# Patient Record
Sex: FEMALE | Race: WHITE
[De-identification: names, ages, dates, MRNs, and addresses within clinical notes are randomized per-mention and may not be internally consistent; named-entity substitution may affect disease eponyms.]

---

## 2017-01-15 NOTE — ER DOCUMENT REPORT
HPI





- HPI


Patient complains to provider of: hip pain


Pain Level: 4


Context: 


Patient is a 36 old female presents emergency Department complaining of right 

hip pain.  Patient states that she works as a  and that over the past 2 

weeks she's had this right hip pain that radiates down the side of her leg to 

her knee.  She states it is worse with standing and while she is at work.  

States it is a constant ache and at its worse with a burning quality.  Improves 

with rest, heat, ice, Motrin.  Denies any injury or accident





Past medical history significant for hypothyroidism and renal stones


Past surgical history significant for breast augmentation, , tubal 

ligation, T&A





- REPRODUCTIVE


Reproductive: DENIES: Pregnant:





- DERM


Skin Color: Normal, Pink





Past Medical History





- General


Information source: Patient





- Social History


Smoking Status: Unknown if Ever Smoked


Family History: None


Patient has suicidal ideation: No


Patient has homicidal ideation: No


Endocrine Medical History: Reports: Hx Hypothyroidism


Renal/ Medical History: Denies: Hx Peritoneal Dialysis


Past Surgical History: Reports: Hx Breast Surgery, Hx  Section, Hx 

Tonsillectomy





Vertical Provider Document





- CONSTITUTIONAL


Agree With Documented VS: No - heartbeat 98/m


Exam Limitations: No Limitations


General Appearance: WD/WN, Mild Distress





- INFECTION CONTROL


TRAVEL OUTSIDE OF THE [place] IN LAST 30 DAYS: No





- RESPIRATORY


O2 Sat by Pulse Oximetry: 98





- CARDIOVASCULAR


Cardiovascular: Regular Rate, Regular Rhythm, No Murmur


Pulses: Normal: Radial, Dorsalis pedis


Notes: 


Capillary refill in all lower extremity digits less than 2 seconds





- BACK


Back: Normal Inspection.  negative: CVA Tenderness-Right, CVA Tenderness-Left


Notes: 


No tenderness to palpation, able to ambulate without any difficulty.





- MUSCULOSKELETAL/EXTREMETIES


Musculoskeletal/Extremeties: FROM, Tender - Tenderness to palpation along the 

iliotibial band distribution, No Edema.  negative: Eccymosis





- NEURO


Level of Consciousness: Awake, Alert, Appropriate


Motor/Sensory: No Motor Deficit, No Sensory Deficit





- DERM


Integumentary: Warm, Dry, No Rash





Course





- Re-evaluation


Re-evalutation: 





01/15/17 17:59


Patient is a 36 old female who is visibly uncomfortable but otherwise 

hemodynamically stable in no acute distress.  Stream physical exam along with 

benign x-ray lead to suspicion of IT band syndrome.  Patient was educated on 

cause and proper stretching and appropriate management for her condition and 

she can follow-up with her primary care provider as needed





- Vital Signs


Vital signs: 


 











Temp Pulse Resp BP Pulse Ox


 


 98.6 F   108 H  16   129/79 H  98 


 


 01/15/17 16:11  01/15/17 16:54  01/15/17 16:54  01/15/17 16:11  01/15/17 16:11














- Laboratory


Result Diagrams: 


 01/15/17 16:15





 01/15/17 16:15


Laboratory results interpreted by me: 


 











  01/15/17 01/15/17 01/15/17





  16:15 16:15 16:15


 


WBC  13.0 H  


 


RBC  5.34 H  


 


Hct  47.3 H  


 


Seg Neutrophils %  82.1 H  


 


Lymphocytes %  11.8 L  


 


Absolute Neutrophils  10.6 H  


 


Glucose   161 H 


 


Calcium   10.3 H 


 


Ur Leukocyte Esterase    TRACE H














Discharge





- Discharge


Clinical Impression: 


IT band syndrome


Qualifiers:


 Laterality: right Qualified Code(s): M76.31 - Iliotibial band syndrome, right 

leg





Condition: Good


Disposition: HOME, SELF-CARE


Instructions:  Muscle Strain ([facility]), Use of Over-The-Counter Ibuprofen ([facility]), 

Warm Packs ([facility]), Ice Massage ([facility])


Additional Instructions: 


-That causes her pain is likely due to iliotibial band syndrome.


- Please see attached stretches and information on over-the-counter medications 

for symptom management.


- You can follow-up with her primary care provider as needed


Forms:  Return to Work

## 2017-01-15 NOTE — ER DOCUMENT REPORT
ED Medical Screen (RME)





- General


Chief Complaint: Hip Pain


Stated Complaint: RIGHT HIP PAIN


Notes: 


Right hip pain for 2 weeks with no known injury.


I greeted and performed a rapid initial assessment of this patient. 

Comprehensive ED assessment and evaluation of the patient, analysis of test 

results and completion of the medical decision making process will be conducted 

by additional ED providers.


TRAVEL OUTSIDE OF THE [place] IN LAST 30 DAYS: No





- Related Data


Allergies/Adverse Reactions: 


 





naproxen [From Aleve] Allergy (Verified 16 15:23)


 











Past Medical History


Endocrine Medical History: Reports: Hx Hypothyroidism


Past Surgical History: Reports: Hx Breast Surgery, Hx  Section, Hx 

Tonsillectomy

## 2017-01-17 NOTE — ER DOCUMENT REPORT
ED GI/





- General


Chief Complaint: Abdominal Pain


Stated Complaint: RIGHT HIP PAIN


Time seen by provider: 16:30


Mode of Arrival: Ambulatory


Information source: Patient


Notes: 


36-year-old female presents to ED for right flank pain with blood in her urine 

nausea since the .  Symptoms are not getting better but are actually 

getting worse.  Patient does have a history of a kidney stone.


TRAVEL OUTSIDE OF THE [place] IN LAST 30 DAYS: No





- HPI


Patient complains to provider of: Flank pain


Onset: Last week


Timing/Duration: Gradual, Intermittent, Persistent


Quality of pain: Sharp


Severity at maximum: Severe


Severity in ED: Moderate


Pain Level: 3


Location: Right flank


Vaginal bleeding (Compared to normal period): None


Associated symptoms: Nausea


Exacerbated by: Movement, Walking


Relieved by: Denies


Similar symptoms previously: Yes


Recently seen / treated by doctor: Yes





- Related Data


Allergies/Adverse Reactions: 


 





naproxen [From Aleve] Allergy (Verified 17 15:04)


 











Past Medical History





- General


Information source: Patient





- Social History


Smoking Status: Current Every Day Smoker


Cigarette use (# per day): Yes - 2-3 cigarettes a day


Chew tobacco use (# tins/day): No


Smoking Education Provided: Yes - less than 1 minute


Frequency of alcohol use: Occasional


Drug Abuse: None


Occupation: [employer] and [employer]


Lives with: Family, Friend


Family History: Arthritis, CAD, COPD, CVA, DM, Hyperlipidemia, Hypertension, 

Malignancy, Thyroid Disfunction


Patient has suicidal ideation: No


Patient has homicidal ideation: No





- Past Medical History


Cardiac Medical History: Reports: None


Pulmonary Medical History: Reports: Other - Pulmonary sarcoidosis


EENT Medical History: Reports: None


Neurological Medical History: Reports: None


Endocrine Medical History: Reports: Hx Hypothyroidism


Renal/ Medical History: Reports: None


Malignancy Medical History: Reports: None


GI Medical History: Reports: None


Musculoskeltal Medical History: Reports Other - Scoliosis


Skin Medical History: Reports None


Psychiatric Medical History: Reports: None


Traumatic Medical History: Reports: None


Surgical Hx: Negative


Past Surgical History: Reports: Hx Breast Surgery, Hx  Section - 3, Hx 

Nose Surgery - Deviated septum, Hx Tonsillectomy





- Immunizations


Immunizations up to date: Yes





Review of Systems





- Review of Systems


Constitutional: No symptoms reported


EENT: No symptoms reported


Cardiovascular: No symptoms reported


Respiratory: No symptoms reported


Gastrointestinal: Nausea


Genitourinary: Flank pain, Hematuria


Female Genitourinary: No symptoms reported


Musculoskeletal: No symptoms reported


Skin: No symptoms reported


Hematologic/Lymphatic: No symptoms reported


Neurological/Psychological: No symptoms reported





Physical Exam





- Vital signs


Vitals: 


 











Temp Pulse Resp BP


 


 97.9 F   94   22 H  122/62 


 


 17 14:43  17 14:43  17 14:43  17 14:43











Interpretation: Normal





- General


General appearance: Appears well, Alert





- HEENT


Head: Normocephalic, Atraumatic


Eyes: Normal


Pupils: PERRL





- Respiratory


Respiratory status: No respiratory distress


Chest status: Nontender


Breath sounds: Normal


Chest palpation: Normal





- Cardiovascular


Rhythm: Regular


Heart sounds: Normal auscultation


Murmur: No





- Abdominal


Inspection: Normal


Distension: No distension


Bowel sounds: Normal


Tenderness: Tender - Right flank


Organomegaly: No organomegaly





- Back


Back: Normal, Tender - Right flank, CVA tenderness - Right.  No: Deformity/step-

off, Vertebra tenderness, Scars, Scoliosis, Wounds





- Extremities


General upper extremity: Normal inspection, Nontender, Normal color, Normal ROM

, Normal temperature


General lower extremity: Normal inspection, Nontender, Normal color, Normal ROM

, Normal temperature, Normal weight bearing.  No: Charan's sign





- Neurological


Neuro grossly intact: Yes


Cognition: Normal


Orientation: AAOx4


Hilda Coma Scale Eye Opening: Spontaneous


[place] Coma Scale Verbal: Oriented


Hilda Coma Scale Motor: Obeys Commands


[place] Coma Scale Total: 15


Speech: Normal


Motor strength normal: LUE, RUE, LLE, RLE


Sensory: Normal





- Psychological


Associated symptoms: Normal affect, Normal mood





- Skin


Skin Temperature: Warm


Skin Moisture: Dry


Skin Color: Normal





Course





- Re-evaluation


Re-evalutation: 





17 18:37


Discussed CTs with patient and patient patient started on azithromycin 

discharged home with instructions to follow-up with her primary doctor within 

the next 2-3 days.





- Vital Signs


Vital signs: 


 











Temp Pulse Resp BP Pulse Ox


 


 97.9 F   94   22 H  122/62    


 


 17 14:43  17 14:43  17 14:43  17 14:43   














- Laboratory


Result Diagrams: 


 17 15:30





 17 15:30


Laboratory results interpreted by me: 


 











  17





  15:30 15:30 15:30


 


WBC  16.4 H  


 


Seg Neutrophils %  84.1 H  


 


Lymphocytes %  7.8 L  


 


Absolute Neutrophils  13.8 H  


 


Glucose   65 L 


 


Lipase   17.5 L 


 


Urine Blood    MODERATE H














- Diagnostic Test


Radiology reviewed: Image reviewed, Reports reviewed





Discharge





- Discharge


Clinical Impression: 


 Ground glass opacity present on imaging of lung, non obstructing kidney stones





Pneumonia


Qualifiers:


 Pneumonia type: due to unspecified organism Laterality: right Lung location: 

middle lobe of lung Qualified Code(s): J18.1 - Lobar pneumonia, unspecified 

organism





Condition: Stable


Disposition: HOME, SELF-CARE


Instructions:  Family Physicians / Practices


Additional Instructions: 


KIDNEY STONE:





     You are passing or have passed a kidney stone.  These stones are usually 

due to increased calcium or uric acid concentrations in your urine.  Stones 

within the kidney itself are not painful.  The pain occurs as the stone leaves 

the kidney to pass down the long tube, called the ureter, leading to the 

bladder.


     If the stone is small, it will usually pass by itself.  Most patients can 

pass the stone at home.  You will usually receive medications for pain, nausea 

or vomiting, and sometimes a medication to assist in passing the kidney stone.  

However, if the pain is very severe or if vomiting prevents you from taking 

oral pain medications, you may need to return for further treatment.


     Drink three or four quarts of fluids per day.  You will be given pain 

medication (if needed) and urine strainers.  Strain all your urine to see if 

the stone passes.  If your doctor has asked you to bring the stone in for 

analysis, return with the stone once it has passed.


     Return if pain or vomiting become severe, if you develop a high fever, if 

you are unable to pass your urine, or if other unusual symptoms occur.





Your stones are nonobstructing at this time and you do not have pain from the 

stone.





PNEUMONIA:





     Your examination indicates that you have pneumonia.  This is an infection 

of the lung tissue, usually caused by bacteria or a virus. Symptoms include 

cough, fever, shaking chills, chest pain, shortness of breath, and coughing up 

bloody sputum.


     Treatment for bacterial pneumonia includes rest, antibiotics for 10 to 14 

days, increasing your clear liquid intake, a cool mist humidifier at your 

bedside, and fever medication.  Often, a repeat chest X-ray is performed in a 

few weeks--even if you feel better--to ascertain whether the infection has 

completely resolved and no underlying lung problem is present.


     You should call the physician if you develop persistent vomiting, high 

fever that does not respond to fever medication, increasing shortness of breath

, confusion, or lethargy.  Also, failure to improve within two to three days is 

an indication for re-examination.





AZITHROMYCIN:


     Azithromycin (Zithromax) is a broad spectrum antibiotic in the same class 

as erythromycin.  It can treat a variety of bacterial infections, but is most 

frequently used for respiratory infections.


     Azithromycin is extremely long-lasting.  It accumulates in body tissues 

and continues to kill bacteria for many days.


     In order to improve absorption, Azithromycin should be taken at least one 

hour before or two hours after a meal.  It does not have the same strong 

tendency to upset the stomach as erythromycin and is usually very well 

tolerated.


     Patients who have had a rash or other true allergic reactions to 

erythromycin should not take this medication.  Call if you develop 

gastrointestinal distress, severe diarrhea, rash, hives, itching, or shortness 

of breath.








USE OF ACETAMINOPHEN (Tylenol):


     Acetaminophen may be taken for pain relief or fever control. It's much 

safer than aspirin, offering a wider range of "safe" dosages.  It is safe 

during pregnancy.  Some brand names are Tylenol, Panadol, Datril, Anacin 3, 

Tempra, and Liquiprin. Acetaminophen can be repeated every four hours.  The 

following are maximum recommended dosages:





WEIGHT         Dose             Drops                  Elixir        Chewable(

80mg)


(LBS.)                            drprs=droppers    tsp=teaspoon


6               40 mg            0.4 ml (1/2)


6-11            80 mg            0.8 ml (full)              tsp               

   1       tab


12-16         120 mg           1 1/2 drprs             3/4  tsp               1 

1/2  tabs


17-23         160 mg             2  drprs             1    tsp                 

  2       tabs


24-30         240 mg             3  drprs             1 1/2 tsp                

3       tabs


30-35         320 mg                                       2    tsp            

       4       tabs


36-41         360 mg                                       2 1/4   tsp         

     4 1/2 tabs


42-47         400 mg                                       2 1/2   tsp         

     5      tabs


48-53         480 mg                                       3    tsp            

       6      tabs


54-59         520 mg                                       3  1/4  tsp         

     6 1/2 tabs


60-64         560 mg                                       3  1/2  tsp         

     7      tabs 


65-70         600 mg                                       3  3/4  tsp         

     7 1/2 tabs


71-76         640 mg                                       4   tsp             

      8      tabs


77-82         720 mg                                       4 1/2   tsp         

    9      tabs


83-88         800 mg                                       5   tsp             

    10      tabs





>89 pounds or adults          650 mg to 900 mg





Acetaminophen can be repeated every four hours.  Maximum dose not to exceed 

4000 mg a day.





   These maximum recommended dosages are slightly higher than the dosages 

written on the product container, but these dosages are very safe and below the 

toxic dosage for acetaminophen.








USE OF OVER-THE-COUNTER IBUPROFEN:


     Ibuprofen (Advil, Nuprin, Medipren, Motrin IB) is a medication for fever 

and pain control.  In addition, it has anti- inflammatory effects which may be 

beneficial, especially in the treatment of injuries.


     It's best to take ibuprofen with food.  Persons with ulcer disease or 

allergy to aspirin should notify their physician of this before taking 

ibuprofen.


     Ibuprofen can be given every four to six hours, for a total of four doses 

daily.


     Age              Pain or fever dose          Antiinflammatory dose


     6-8 yr              200 mg (1 tab)                200 mg (1 tab)


     9-11 yr             200 mg (1 tab)                200-400 mg (1-2 tab)


     11-14 yr            200-400 mg (1-2 tab)         400 mg (2 tab)


     15-adult            400 mg (2 tab)                600 mg (3 tab)





FOLLOW-UP CARE:


If you have been referred to a physician for follow-up care, call the physician

s office for an appointment as you were instructed or within the next two days.

  If you experience worsening or a significant change in your symptoms, notify 

the physician immediately or return to the Emergency Department at any time for 

re-evaluation.


Prescriptions: 


Azithromycin [Zithromax 250 mg Tablet] 250 mg PO ASDIR PRN #6 tablet


 PRN Reason: 


Forms:  Return to Work

## 2017-01-17 NOTE — ER DOCUMENT REPORT
ED Medical Screen (RME)





- General


Stated Complaint: RIGHT HIP PAIN


Mode of Arrival: Ambulatory


Information source: Patient


Notes: 


Patient complains of right lower back pain that radiates around to right side 

of abdomen.  Patient states occasionally the pain will radiate to right lower 

extremity.  Patient does report some nausea.  Patient was evaluated 2 days ago 

in the ER for this complaint.





Hx: Scoliosis, hypothyroid





I have greeted and performed a rapid initial assessment of this patient.  A 

comprehensive ED assessment and evaluation of the patient, analysis of test 

results and completion of the medical decision making process will be conducted 

by additional ED providers.


TRAVEL OUTSIDE OF THE [place] IN LAST 30 DAYS: No





- Related Data


Allergies/Adverse Reactions: 


 





naproxen [From Aleve] Allergy (Verified 16 15:23)


 











Past Medical History


Endocrine Medical History: Reports: Hx Hypothyroidism


Renal/ Medical History: Denies: Hx Peritoneal Dialysis


Past Surgical History: Reports: Hx Breast Surgery, Hx  Section, Hx 

Tonsillectomy





Physical Exam





- Vital signs


Vitals: 





 











Temp Pulse Resp BP


 


 97.9 F   94   22 H  122/62 


 


 17 14:43  17 14:43  17 14:43  17 14:43














- Abdominal


Tenderness: Tender - Right lateral side of abdomen





Course





- Vital Signs


Vital signs: 





 











Temp Pulse Resp BP Pulse Ox


 


 97.9 F   94   22 H  122/62    


 


 17 14:43  17 14:43  17 14:43  17 14:43

## 2017-01-31 NOTE — ER DOCUMENT REPORT
ED General





- General


Time seen by provider: 19:00


Information source: Patient, [facility] Records





<ALY HAYS - Last Filed: 17 19:30>





- General


Mode of Arrival: Ambulatory


Information source: Patient, [facility] Records


TRAVEL OUTSIDE OF THE [place] IN LAST 30 DAYS: No





- HPI


Onset: Other - last few weeks


Onset/Duration: Persistent


Quality of pain: Achy


Associated symptoms: None


Similar symptoms previously: Yes


Recently seen / treated by doctor: Yes





<HALIE ANDERSON - Last Filed: 17 20:19>





- General


Chief Complaint: Hip Pain


Stated Complaint: SIDE PAIN


Notes: 


This 36-year-old female patient comes emergency room for right low back pain 

for the past few weeks.  She notes it is worse when she is walking.  She works 

as a .  The pain in the right low back came first, then over time she 

began having pain going down the right lateral thigh.  She believes this may be 

due to altered walking mechanics compensating for the pain in the low back.


She does have a past medical history of pulmonary sarcoidosis, hypothyroidism.  

She was seen here on 01/15/2017 and diagnosed with iliotibial band syndrome and 

recommended heat and Motrin.  She was seen here 2 days later and had CT scans 

of the abdomen and pelvis and a CTA of the chest.  The scans showed 

nephrolithiasis without ureterolithiasis.  It showed chronic interstitial lung 

disease which spares the right middle lobe.  This is probably do to her history 

of sarcoidosis and her smoking history.  She did quit smoking on that visit on 

2017.  The scan also showed calcified granulomas and pulmonary 

consultation was recommended.  The radiologist had read around grass appearance 

airspace disease possibility of pneumonia, so the patient was diagnosed with 

pneumonia and prescribed Zithromax.  She has not received any benefit from the 

treatment so far.


Physical exam today shows the pain is specifically in the right lumbar sacral 

muscles.  Palpating the area reproduces the pain acutely.  There is some mild 

tenderness along the iliotibial band.  The lungs are essentially clear. (SAÚLMARY PEDROZAALL)


Patient is a 36-year-old female that presents to the emergency department today 

with complaints of right low back pain.  Patient states that she has had the 

pain for the last few weeks.  Patient states her pain is exacerbated when 

walking.  Patient states the pain initially began a few days ago in her right 

low back and has progressed down her right lateral thigh.  Patient states she 

believes that she is over compensating when walking due to her low back pain 

which has caused the increasing, moving pain. (HALIE ANDERSON)





- Related Data


Allergies/Adverse Reactions: 


 





naproxen [From Aleve] Allergy (Verified 17 17:36)


 











Past Medical History





- General


Information source: Patient





- Social History


Smoking Status: Former Smoker


Cigarette use (# per day): No - quit on previous visit, 17


Chew tobacco use (# tins/day): Yes


Frequency of alcohol use: Occasional


Drug Abuse: None


Occupation: , 


Family History: Reviewed & Not Pertinent


Patient has suicidal ideation: No


Patient has homicidal ideation: No


Endocrine Medical History: Reports: Hx Hypothyroidism


Past Surgical History: Reports: Hx Breast Surgery, Hx  Section - 3, Hx 

Nose Surgery - Deviated septum, Hx Tonsillectomy





- Immunizations


Immunizations up to date: Yes





<HALIE ANDERSON - Last Filed: 17 20:19>





Review of Systems





- Review of Systems


Constitutional: No symptoms reported


EENT: No symptoms reported


Cardiovascular: No symptoms reported


Respiratory: No symptoms reported


Gastrointestinal: No symptoms reported


Genitourinary: No symptoms reported


Female Genitourinary: No symptoms reported


Musculoskeletal: See HPI, Back pain - right lower back pain


Skin: No symptoms reported


Hematologic/Lymphatic: No symptoms reported


Neurological/Psychological: No symptoms reported


-: Yes All other systems reviewed and negative





<HALIE ANDERSON - Last Filed: 17 20:19>





Physical Exam





- General


General appearance: Appears well, Alert


In distress: None





- HEENT


Head: Normocephalic, Atraumatic


Eyes: Normal


Extraocular movements intact: Yes





- Respiratory


Respiratory status: No respiratory distress





- Cardiovascular


Rhythm: Regular


Heart sounds: Normal auscultation


Murmur: No





- Abdominal


Inspection: Normal


Distension: No distension





- Back


Back: Tender - Exquisite tenderness with palpation over the lower lumbar 

muscles and upper sacral muscles.  Reproduces chief complaint..  No: Deformity/

step-off





- Extremities


General upper extremity: Normal inspection, Normal ROM.  No: Edema


General lower extremity: Normal inspection, Normal ROM.  No: Edema





- Neurological


Neuro grossly intact: Yes


Cognition: Normal


Speech: Normal





- Psychological


Associated symptoms: Normal affect, Normal mood





- Skin


Skin Temperature: Warm


Skin Moisture: Dry


Skin Color: Normal





<HALIE ANDERSON - Last Filed: 17 20:19>





- Vital signs


Vitals: 


 











Temp Pulse Resp BP Pulse Ox


 


 97.8 F   80   14   119/69   99 


 


 17 17:12  17 17:12  17 17:12  17 17:12  17 17:12








 (ALY HAYS)


 (HALIE ANDERSON)





Course





- Laboratory


Result Diagrams: 


 17 17:45





 17 17:45





<ALY HAYS - Last Filed: 17 19:30>





- Laboratory


Result Diagrams: 


 17 17:45





 17 17:45





<HALIE ANDERSON - Last Filed: 17 20:19>





- Vital Signs


Vital signs: 


 











Temp Pulse Resp BP Pulse Ox


 


 97.8 F   80   14   119/69   99 


 


 17 17:12  17 17:12  17 17:12  17 17:12  17 17:12








 (ALY HAYS)


 (HALIE ANDERSON)





- Laboratory


Laboratory results interpreted by me: 


 











  17





  17:45 17:50


 


WBC  14.2 H 


 


Absolute Neutrophils  10.2 H 


 


Urine Blood   SMALL H








 (ALY HAYS)


 (HALIE ANDERSON)





Discharge





<ALY HAYS - Last Filed: 17 19:30>





<HALIE ANDERSON - Last Filed: 17 20:19>





- Discharge


Clinical Impression: 


Lumbar back sprain


Qualifiers:


 Encounter type: initial encounter Qualified Code(s): S33.5XXA - Sprain of 

ligaments of lumbar spine, initial encounter





Additional Instructions: 


Low Back Pain:





     Three out of every four people will have an episode of disabling back pain 

during their lifetime.  Most commonly the pain is due to straining of the 

muscles and ligaments in the low back.


     Usual treatment includes: 


(1) Rest on a firm surface.  Avoid lying on your stomach.  


(2) Ice pack the painful area.  After a few days, gentle heat may be used 

intermittently to relax the area, or ice packs can be continued.  


(3) Medication may be needed -- muscle relaxers and antiinflammatory medicines 

are commonly used.  


(4) As the back improves, exercises are prescribed to strengthen the back and 

abdominal muscles.


     Your doctor will advise you on the proper care for your back at each stage 

in your recovery.  You may be better in a few days -- or healing may take 

several weeks.


     If new symptoms of a "herniated disc" (radiation of pain, numbness, or 

tingling down the back of the leg or weakness in the leg) occur, you should be 

re-examined.  Further testing may be necessary.








TAKE THE MEDICATION AS PRESCRIBED.


TAKE OTC MOTRIN 600mg EVERY EIGHT HOURS.


REST.  TRY MOIST HEAT APPLICATIONS.


AVOID ACTIVITY THAT WORSENS THE PAIN.


FOLLOW UP WITH A LOCAL MEDICAL DOCTOR IF NOT IMPROVING.





Follow-up with a local pulmonary medicine doctor to follow and manage your 

pulmonary sarcoidosis.





RETURN TO THE EMERGENCY ROOM IF ANY NEW OR WORSENING SYMPTOMS.











Prescriptions: 


Cyclobenzaprine HCl [Flexeril 5 mg Tablet] 5 mg PO TID PRN #15 tablet


 PRN Reason: 


Oxycodone HCl/Acetaminophen [Percocet 5-325 mg Tablet] 1 - 2 tab PO ASDIR PRN #

15 tablet


 PRN Reason: 


Scribe Attestation: 





17 19:39


I personally performed the services described in the documentation, reviewed 

and edited the documentation which was dictated to the scribe in my presence, 

and it accurately records my words and actions. (ALY HAYS)





Scribe Documentation





- Scribe


Written by Shavonne:: Shavonne Velasco, 17 2019


acting as scribe for :: Saúl





<HALIE ANDERSON - Last Filed: 17 20:19>

## 2017-03-02 ENCOUNTER — HOSPITAL ENCOUNTER (EMERGENCY)
Dept: HOSPITAL 62 - ER | Age: 37
Discharge: HOME | End: 2017-03-02
Payer: SELF-PAY

## 2017-03-02 VITALS — SYSTOLIC BLOOD PRESSURE: 124 MMHG | DIASTOLIC BLOOD PRESSURE: 77 MMHG

## 2017-03-02 DIAGNOSIS — R53.83: ICD-10-CM

## 2017-03-02 DIAGNOSIS — R43.8: ICD-10-CM

## 2017-03-02 DIAGNOSIS — J04.0: ICD-10-CM

## 2017-03-02 DIAGNOSIS — Z91.14: ICD-10-CM

## 2017-03-02 DIAGNOSIS — E03.9: ICD-10-CM

## 2017-03-02 DIAGNOSIS — Z79.899: ICD-10-CM

## 2017-03-02 DIAGNOSIS — R11.2: ICD-10-CM

## 2017-03-02 DIAGNOSIS — M46.1: Primary | ICD-10-CM

## 2017-03-02 LAB
ALBUMIN SERPL-MCNC: 4.7 G/DL (ref 3.5–5)
ALP SERPL-CCNC: 100 U/L (ref 38–126)
ALT SERPL-CCNC: 38 U/L (ref 9–52)
ANION GAP SERPL CALC-SCNC: 12 MMOL/L (ref 5–19)
APPEARANCE UR: CLEAR
AST SERPL-CCNC: 25 U/L (ref 14–36)
BASOPHILS # BLD AUTO: 0.1 10^3/UL (ref 0–0.2)
BASOPHILS NFR BLD AUTO: 0.6 % (ref 0–2)
BILIRUB DIRECT SERPL-MCNC: 0 MG/DL (ref 0–0.3)
BILIRUB SERPL-MCNC: 0.4 MG/DL (ref 0.2–1.3)
BILIRUB UR QL STRIP: NEGATIVE
BUN SERPL-MCNC: 6 MG/DL (ref 7–20)
CALCIUM: 9.9 MG/DL (ref 8.4–10.2)
CHLORIDE SERPL-SCNC: 102 MMOL/L (ref 98–107)
CO2 SERPL-SCNC: 32 MMOL/L (ref 22–30)
CREAT SERPL-MCNC: 0.79 MG/DL (ref 0.52–1.25)
EOSINOPHIL # BLD AUTO: 0.2 10^3/UL (ref 0–0.6)
EOSINOPHIL NFR BLD AUTO: 1.5 % (ref 0–6)
ERYTHROCYTE [DISTWIDTH] IN BLOOD BY AUTOMATED COUNT: 13.5 % (ref 11.5–14)
GLUCOSE SERPL-MCNC: 116 MG/DL (ref 75–110)
GLUCOSE UR STRIP-MCNC: NEGATIVE MG/DL
HCT VFR BLD CALC: 42.8 % (ref 36–47)
HGB BLD-MCNC: 14.7 G/DL (ref 12–15.5)
HGB HCT DIFFERENCE: 1.3
KETONES UR STRIP-MCNC: NEGATIVE MG/DL
LIPASE SERPL-CCNC: < 10 U/L (ref 23–300)
LYMPHOCYTES # BLD AUTO: 2.4 10^3/UL (ref 0.5–4.7)
LYMPHOCYTES NFR BLD AUTO: 17.3 % (ref 13–45)
MCH RBC QN AUTO: 30 PG (ref 27–33.4)
MCHC RBC AUTO-ENTMCNC: 34.3 G/DL (ref 32–36)
MCV RBC AUTO: 88 FL (ref 80–97)
MONOCYTES # BLD AUTO: 0.7 10^3/UL (ref 0.1–1.4)
MONOCYTES NFR BLD AUTO: 5.1 % (ref 3–13)
NEUTROPHILS # BLD AUTO: 10.4 10^3/UL (ref 1.7–8.2)
NEUTS SEG NFR BLD AUTO: 75.5 % (ref 42–78)
NITRITE UR QL STRIP: NEGATIVE
PH UR STRIP: 7 [PH] (ref 5–9)
POTASSIUM SERPL-SCNC: 4.5 MMOL/L (ref 3.6–5)
PROT SERPL-MCNC: 7.9 G/DL (ref 6.3–8.2)
PROT UR STRIP-MCNC: NEGATIVE MG/DL
RBC # BLD AUTO: 4.89 10^6/UL (ref 3.72–5.28)
SODIUM SERPL-SCNC: 146 MMOL/L (ref 137–145)
SP GR UR STRIP: 1
UROBILINOGEN UR-MCNC: NEGATIVE MG/DL (ref ?–2)
WBC # BLD AUTO: 13.8 10^3/UL (ref 4–10.5)

## 2017-03-02 PROCEDURE — 99283 EMERGENCY DEPT VISIT LOW MDM: CPT

## 2017-03-02 PROCEDURE — 84439 ASSAY OF FREE THYROXINE: CPT

## 2017-03-02 PROCEDURE — 85025 COMPLETE CBC W/AUTO DIFF WBC: CPT

## 2017-03-02 PROCEDURE — 83690 ASSAY OF LIPASE: CPT

## 2017-03-02 PROCEDURE — 85652 RBC SED RATE AUTOMATED: CPT

## 2017-03-02 PROCEDURE — 86140 C-REACTIVE PROTEIN: CPT

## 2017-03-02 PROCEDURE — 80053 COMPREHEN METABOLIC PANEL: CPT

## 2017-03-02 PROCEDURE — 81025 URINE PREGNANCY TEST: CPT

## 2017-03-02 PROCEDURE — 84443 ASSAY THYROID STIM HORMONE: CPT

## 2017-03-02 PROCEDURE — 36415 COLL VENOUS BLD VENIPUNCTURE: CPT

## 2017-03-02 PROCEDURE — 81001 URINALYSIS AUTO W/SCOPE: CPT

## 2017-03-02 NOTE — ER DOCUMENT REPORT
ED Medical Screen (RME)





- General


Stated Complaint: SHAKING,VOMITING


Mode of Arrival: Ambulatory


Information source: Patient


Notes: 


c/o of right hip pain (chronic), fatigue, intermittent vomiting, shaking, dizzy

, increased thirst over the past week. She reports history of hypothyroidism, 

taking Levothyroxine and endorses compliance. 


Denies fever, diarrhea, urinary symptoms. 





I have greeted and performed a rapid initial assessment of this patient. A 

comprehensive ED assessment and evaluation of the patient, analysis of test 

results and completion of the medical decision making process will be conducted 

by additional ED providers.


TRAVEL OUTSIDE OF THE [place] IN LAST 30 DAYS: No





- Related Data


Allergies/Adverse Reactions: 


 





naproxen [From Aleve] Allergy (Verified 17 16:15)


 











Past Medical History


Endocrine Medical History: Reports: Hx Hypothyroidism


Renal/ Medical History: Denies: Hx Peritoneal Dialysis


Past Surgical History: Reports: Hx Breast Surgery, Hx  Section - 3, Hx 

Nose Surgery - Deviated septum, Hx Tonsillectomy





- Immunizations


Immunizations up to date: Yes





Physical Exam





- Vital signs


Vitals: 





 











Temp Pulse Resp BP Pulse Ox


 


 97.3 F   108 H  16   145/88 H  97 


 


 17 16:15  17 16:15  17 16:15  17 16:15  17 16:15














- Notes


Notes: 


General: no respiratory distress, speaking in full sentences without difficulty





Course





- Vital Signs


Vital signs: 





 











Temp Pulse Resp BP Pulse Ox


 


 97.3 F   108 H  16   145/88 H  97 


 


 17 16:15  17 16:15  17 16:15  17 16:15  17 16:15

## 2017-03-02 NOTE — ER DOCUMENT REPORT
HPI





- HPI


Patient complains to provider of: hip pain and losing voice


Onset: Other - 2 months


Onset/Duration: Gradual, Better


Pain Level: 3


Context: 


37-year-old female stating that she has not felt well for 2 months thinking 

that maybe her thyroid is under dosed with 125 g of Synthroid that she has 

been taking for 8 months.  She has no primary care doctor here in [place].

  Having right posterior hip pain but it is actually better.  She is seen 

several times in the emergency department for this along with other symptoms.  

The symptoms she is also complaining of for 3 days is slightly losing her voice 

and losing the taste.  She states she has been very fatigued for 2 months.  No 

Fever.  sHe did vomit once today due to nausea.  No abdominal pain.  No chest 

pain.  No shortness of breath.  No leg pain.


Associated Symptoms: None


Exacerbated by: Denies


Relieved by: Denies


Similar symptoms previously: No


Recently seen / treated by doctor: No





- ROS


ROS below otherwise negative: Yes


Systems Reviewed and Negative: Yes All other systems reviewed and negative





- REPRODUCTIVE


LMP: [id number]


Reproductive: DENIES: Pregnant:





- DERM


Skin Color: Normal





Past Medical History





- General


Information source: Patient





- Social History


Smoking Status: Current Every Day Smoker


Chew tobacco use (# tins/day): No


Frequency of alcohol use: None


Drug Abuse: None


Lives with: Family


Family History: Reviewed & Not Pertinent


Patient has suicidal ideation: No


Patient has homicidal ideation: No





- Medical History


Medical History: Negative


Endocrine Medical History: Reports: Hx Hypothyroidism


Renal/ Medical History: Denies: Hx Peritoneal Dialysis


Past Surgical History: Reports: Hx Breast Surgery, Hx  Section - 3, Hx 

Nose Surgery - Deviated septum, Hx Tonsillectomy





- Immunizations


Immunizations up to date: Yes





Vertical Provider Document





- CONSTITUTIONAL


Agree With Documented VS: Yes


Exam Limitations: No Limitations





- INFECTION CONTROL


TRAVEL OUTSIDE OF THE [place] IN LAST 30 DAYS: No





- HEENT


HEENT: Normocephalic, PERRLA, Pharyngeal Erythema.  negative: Conjuctival 

Injection, Tympanic Membrane Red, Tympanic Membrane Bulging - Minimal





- NECK


Neck: Supple, Thyroid Normal.  negative: Lymphadenopathy-Left, Lymphadenopathy-

Right





- RESPIRATORY


Respiratory: Breath Sounds Normal, No Respiratory Distress


O2 Sat by Pulse Oximetry: 97





- CARDIOVASCULAR


Cardiovascular: Regular Rate, Regular Rhythm





- GI/ABDOMEN


Gastrointestinal: Abdomen Soft, Abdomen Non-Tender, No Organomegaly





- MUSCULOSKELETAL/EXTREMETIES


Musculoskeletal/Extremeties: MAEW, FROM, Tender - Right sacroiliac joint





- NEURO


Level of Consciousness: Awake, Alert


Motor/Sensory: No Motor Deficit, No Sensory Deficit


Deep Tendon Reflexes: 2+ - Bilateral patellar and ankle





- DERM


Integumentary: Warm, Dry





Course





- Re-evaluation


Re-evalutation: 


17 18:07


consult dr. reis, add free t4, ESR and CRP.





17 20:02


Patient now tells me that she took 3 days of approximately an 156 g of 

Synthroid prior to the level drawn today so that the free T4 drawn today was 

not an accurate level for her prescribed dose of 125 g.  I explained that she 

needs to see family practice and to get a another level drawn and they can 

manage her Synthroid doses.  I will give her copies of all the lab work and 

studies done at [facility].





17 20:03








- Vital Signs


Vital signs: 


 











Temp Pulse Resp BP Pulse Ox


 


 97.3 F   108 H  16   145/88 H  97 


 


 17 16:15  17 16:15  17 16:15  17 16:15  17 16:15














- Laboratory


Result Diagrams: 


 17 16:20





 17 16:20


Laboratory results interpreted by me: 


 











  17





  16:20 16:20


 


WBC  13.8 H 


 


Absolute Neutrophils  10.4 H 


 


Sodium   146.0 H


 


Carbon Dioxide   32 H


 


BUN   6 L


 


Glucose   116 H


 


Lipase   < 10.0 L














Discharge





- Discharge


Clinical Impression: 


 right sacroilitis joint pain, mild laryngitis





Fatigue


Qualifiers:


 Fatigue type: unspecified Qualified Code(s): R53.83 - Other fatigue





Condition: Good


Disposition: HOME, SELF-CARE


Instructions:  Family Physicians / Practices, Laryngitis ([facility]), Fatigue ([facility]), 

Low Back Pain ([facility])


Additional Instructions: 


copy of labwork given to you


See family practice doctor for follow-up, take only the prescribed 125mcg of 

synthroid prior to another level drawn to determine if your    symptoms are 

hypothyrodism


to er if worse





Forms:  Return to Work

## 2017-07-14 ENCOUNTER — HOSPITAL ENCOUNTER (EMERGENCY)
Dept: HOSPITAL 62 - ER | Age: 37
Discharge: HOME | End: 2017-07-14
Payer: SELF-PAY

## 2017-07-14 VITALS — SYSTOLIC BLOOD PRESSURE: 132 MMHG | DIASTOLIC BLOOD PRESSURE: 98 MMHG

## 2017-07-14 DIAGNOSIS — R23.8: ICD-10-CM

## 2017-07-14 DIAGNOSIS — R51: ICD-10-CM

## 2017-07-14 DIAGNOSIS — B37.3: ICD-10-CM

## 2017-07-14 DIAGNOSIS — K52.9: Primary | ICD-10-CM

## 2017-07-14 DIAGNOSIS — R11.2: ICD-10-CM

## 2017-07-14 LAB
ALBUMIN SERPL-MCNC: 4.8 G/DL (ref 3.5–5)
ALP SERPL-CCNC: 118 U/L (ref 38–126)
ALT SERPL-CCNC: 114 U/L (ref 9–52)
ANION GAP SERPL CALC-SCNC: 14 MMOL/L (ref 5–19)
APPEARANCE UR: CLEAR
AST SERPL-CCNC: 63 U/L (ref 14–36)
BASOPHILS # BLD AUTO: 0.1 10^3/UL (ref 0–0.2)
BASOPHILS NFR BLD AUTO: 1.1 % (ref 0–2)
BILIRUB DIRECT SERPL-MCNC: 0.3 MG/DL (ref 0–0.4)
BILIRUB SERPL-MCNC: 0.4 MG/DL (ref 0.2–1.3)
BILIRUB UR QL STRIP: NEGATIVE
BUN SERPL-MCNC: 5 MG/DL (ref 7–20)
CALCIUM: 9.8 MG/DL (ref 8.4–10.2)
CHLORIDE SERPL-SCNC: 105 MMOL/L (ref 98–107)
CO2 SERPL-SCNC: 26 MMOL/L (ref 22–30)
CREAT SERPL-MCNC: 0.68 MG/DL (ref 0.52–1.25)
EOSINOPHIL # BLD AUTO: 0.2 10^3/UL (ref 0–0.6)
EOSINOPHIL NFR BLD AUTO: 1.8 % (ref 0–6)
ERYTHROCYTE [DISTWIDTH] IN BLOOD BY AUTOMATED COUNT: 13.6 % (ref 11.5–14)
GLUCOSE SERPL-MCNC: 107 MG/DL (ref 75–110)
GLUCOSE UR STRIP-MCNC: NEGATIVE MG/DL
HCT VFR BLD CALC: 45.9 % (ref 36–47)
HGB BLD-MCNC: 15.5 G/DL (ref 12–15.5)
HGB HCT DIFFERENCE: 0.6
KETONES UR STRIP-MCNC: NEGATIVE MG/DL
LYMPHOCYTES # BLD AUTO: 2 10^3/UL (ref 0.5–4.7)
LYMPHOCYTES NFR BLD AUTO: 15.1 % (ref 13–45)
MCH RBC QN AUTO: 30 PG (ref 27–33.4)
MCHC RBC AUTO-ENTMCNC: 33.8 G/DL (ref 32–36)
MCV RBC AUTO: 89 FL (ref 80–97)
MONOCYTES # BLD AUTO: 0.6 10^3/UL (ref 0.1–1.4)
MONOCYTES NFR BLD AUTO: 4.6 % (ref 3–13)
NEUTROPHILS # BLD AUTO: 10.3 10^3/UL (ref 1.7–8.2)
NEUTS SEG NFR BLD AUTO: 77.4 % (ref 42–78)
NITRITE UR QL STRIP: NEGATIVE
PH UR STRIP: 6 [PH] (ref 5–9)
POTASSIUM SERPL-SCNC: 4.6 MMOL/L (ref 3.6–5)
PROT SERPL-MCNC: 8.1 G/DL (ref 6.3–8.2)
PROT UR STRIP-MCNC: NEGATIVE MG/DL
RBC # BLD AUTO: 5.16 10^6/UL (ref 3.72–5.28)
SODIUM SERPL-SCNC: 144.8 MMOL/L (ref 137–145)
SP GR UR STRIP: 1
T3FREE SERPL-MCNC: 3.59 PG/ML (ref 2.77–5.27)
TSH SERPL-ACNC: 2.24 UIU/ML (ref 0.47–4.68)
UROBILINOGEN UR-MCNC: NEGATIVE MG/DL (ref ?–2)
WBC # BLD AUTO: 13.4 10^3/UL (ref 4–10.5)

## 2017-07-14 PROCEDURE — 71020: CPT

## 2017-07-14 PROCEDURE — 80053 COMPREHEN METABOLIC PANEL: CPT

## 2017-07-14 PROCEDURE — 85025 COMPLETE CBC W/AUTO DIFF WBC: CPT

## 2017-07-14 PROCEDURE — 84443 ASSAY THYROID STIM HORMONE: CPT

## 2017-07-14 PROCEDURE — 99284 EMERGENCY DEPT VISIT MOD MDM: CPT

## 2017-07-14 PROCEDURE — 81001 URINALYSIS AUTO W/SCOPE: CPT

## 2017-07-14 PROCEDURE — 84481 FREE ASSAY (FT-3): CPT

## 2017-07-14 PROCEDURE — 81025 URINE PREGNANCY TEST: CPT

## 2017-07-14 PROCEDURE — 36415 COLL VENOUS BLD VENIPUNCTURE: CPT

## 2017-07-14 PROCEDURE — 87205 SMEAR GRAM STAIN: CPT

## 2017-07-14 PROCEDURE — 96360 HYDRATION IV INFUSION INIT: CPT

## 2017-07-14 PROCEDURE — 87045 FECES CULTURE AEROBIC BACT: CPT

## 2017-07-14 PROCEDURE — 84439 ASSAY OF FREE THYROXINE: CPT

## 2017-07-14 NOTE — ER DOCUMENT REPORT
ED Medical Screen (RME)





- General


Chief Complaint: Headache


Stated Complaint: HEADACHE,VOMITING,RASH


Time Seen by Provider: 17 14:01


Mode of Arrival: Ambulatory


Information source: Patient


TRAVEL OUTSIDE OF THE [place] IN LAST 30 DAYS: No





- HPI


Patient complains to provider of: rash, HA, vomiting, diarrhea


Onset: Other - pt. with multiple c/o including HA, intermittent vomiting and 

diarrhea for the past few weeks, rash in vaginal area, "bumps" on skin, and 

cough productive of green sputum





- Related Data


Allergies/Adverse Reactions: 


 





naproxen [From Aleve] Allergy (Verified 17 13:52)


 











Past Medical History


Endocrine Medical History: Reports: Hx Hypothyroidism


Renal/ Medical History: Denies: Hx Peritoneal Dialysis


Past Surgical History: Reports: Hx Breast Surgery, Hx  Section - 3, Hx 

Nose Surgery - Deviated septum, Hx Tonsillectomy





- Immunizations


Immunizations up to date: Yes


Hx Diphtheria, Pertussis, Tetanus Vaccination: Yes





Physical Exam





- Vital signs


Vitals: 





 











Temp Pulse Resp BP Pulse Ox


 


 98.6 F   86   16   132/98 H  98 


 


 17 13:47  17 13:47  17 13:47  17 13:47  17 13:47














Course





- Vital Signs


Vital signs: 





 











Temp Pulse Resp BP Pulse Ox


 


 98.6 F   86   16   132/98 H  98 


 


 17 13:47  17 13:47  17 13:47  17 13:47  17 13:47

## 2017-07-14 NOTE — RADIOLOGY REPORT (SQ)
EXAM DESCRIPTION:  CHEST PA/LAT



COMPLETED DATE/TIME:  7/14/2017 2:45 pm



REASON FOR STUDY:  cough



COMPARISON:  January 2017



EXAM PARAMETERS:  NUMBER OF VIEWS: two views

TECHNIQUE: Digital Frontal and Lateral radiographic views of the chest acquired.

RADIATION DOSE: NA

LIMITATIONS: none



FINDINGS:  LUNGS AND PLEURA: No opacities, masses or pneumothorax. No pleural effusion.

MEDIASTINUM AND HILAR STRUCTURES: No masses or contour abnormalities.

HEART AND VASCULAR STRUCTURES: Heart normal size.  No evidence for failure.

BONES: No acute findings.

HARDWARE: None in the chest.

OTHER: No other significant finding.



IMPRESSION:  NO SIGNIFICANT RADIOGRAPHIC FINDING IN THE CHEST.



TECHNICAL DOCUMENTATION:  JOB ID:  [id number]

 2011 [employer]- All Rights Reserved

## 2017-07-14 NOTE — ER DOCUMENT REPORT
ED General





- General


Mode of Arrival: Ambulatory


Information source: Patient


TRAVEL OUTSIDE OF THE [place] IN LAST 30 DAYS: No





- HPI


Associated symptoms: Other - see above





<JADON HICKEY - Last Filed: 17 15:13>





<JENNIFER LIVE - Last Filed: 17 19:55>





- General


Chief Complaint: Headache


Stated Complaint: HEADACHE,VOMITING,RASH


Time Seen by Provider: 17 14:01


Notes: 


Patient is a 37 year old female who presents to the ED with multiple complaints 

of diarrhea since February, intermittent nausea and vomiting, periodic "bumps" 

on her skin, vaginal rash, SOB and a headache that started yesterday.  Patient 

also adds that she has not had a menstrual cycle in the past 4 months.  Patient 

states she has not been able to attend work the past 2 days because of feeling 

uncomfortable. Patient states she is having approximately 15 loose stools a day

; she states she is drinking a large amount of fluid (30, 32 oz glasses of 

water per day) and states she is having to use the restroom every 45 minutes 

and each time there is stool.  Patient denies any blood or tar colored stools 

and no worms have been noted. Patient states she has put on 25 pounds in a 

matter of weeks despite the diarrhea she has been having. Patient has a history 

of migraines and states that her headache is generalized and similar to the 

start of a typical migraine for her.  Patient has been taking Tylenol every 6 

hours to treat her headache.  Patient states she has tried diet changes with no 

change to her symptoms.  Patient has tried Immodium with some relief but states 

she doesn't which to take it every day. Patient states the bumps on her skin 

are on her face, hairline, and pubic area and that she cannot shave due to the 

bumps.  No other concerns or complaints at this time.     (JADON HICKEY)





- Related Data


Allergies/Adverse Reactions: 


 





naproxen [From Aleve] Allergy (Verified 17 13:52)


 











Past Medical History





- General


Information source: Patient





- Social History


Smoking Status: Former Smoker


Chew tobacco use (# tins/day): No


Frequency of alcohol use: None


Drug Abuse: None


Family History: Reviewed & Not Pertinent, Malignancy, Thyroid Disfunction


Endocrine Medical History: Reports: Hx Hypothyroidism


Renal/ Medical History: Denies: Hx Peritoneal Dialysis


Past Surgical History: Reports: Hx Breast Surgery, Hx  Section - 3, Hx 

Nose Surgery - Deviated septum, Hx Tonsillectomy





- Immunizations


Immunizations up to date: Yes


Hx Diphtheria, Pertussis, Tetanus Vaccination: Yes





<JADON HICKEY - Last Filed: 17 15:13>





Review of Systems





- Review of Systems


Constitutional: No symptoms reported


EENT: No symptoms reported


Cardiovascular: No symptoms reported


Respiratory: No symptoms reported


Gastrointestinal: See HPI, Diarrhea, Nausea, Vomiting.  denies: Black stools, 

Rectal bleeding


Genitourinary: No symptoms reported


Female Genitourinary: See HPI, Irregular period


Musculoskeletal: No symptoms reported


Skin: See HPI, Lesions, Rash


Hematologic/Lymphatic: No symptoms reported


Neurological/Psychological: See HPI, Headaches





<RUPERTOJADON - Last Filed: 17 15:13>





Physical Exam





<RUPERTOJADON - Last Filed: 17 15:13>





<JENNIFER LIVE - Last Filed: 17 19:55>





- Vital signs


Vitals: 


 











Temp Pulse Resp BP Pulse Ox


 


 98.6 F   86   16   132/98 H  98 


 


 17 13:47  17 13:47  17 13:47  17 13:47  17 13:47














- Notes


Notes: 


GENERAL: Alert, interacts well. No acute distress.





HEAD: Normocephalic, atraumatic.





EYES: Pupils equal, round, and reactive to light. Extraocular movements intact.





ENT: Oral mucosa moist, tongue midline. 


NECK: Full range of motion. Supple. Trachea midline.





LUNGS: Clear to auscultation bilaterally, no wheezes, rales, or rhonchi. No 

respiratory distress.





HEART: Regular rate and rhythm. No murmurs, gallops, or rubs.





ABDOMEN: Soft, non-tender. Non-distended. Bowel sounds present in all 4 

quadrants.





EXTREMITIES: Moves all 4 extremities spontaneously. No edema, radial and 

dorsalis pedis pulses 2/4 bilaterally. No cyanosis.





NEUROLOGICAL: Alert and oriented x3. Normal speech. Biceps and patellar DTRs 2+ 

bilaterally.





PSYCH: Normal affect, normal mood.





SKIN: Warm, dry, normal turgor. Multiple small pustuals on face and trunk. Left 

upper thigh firm nodule with no fluctuance that is tender to palpation.  

Folliculitis beneath panus.  Skin breakdown and beefy red erythema to external 

labia along with white discharge consistent with vaginal yeast infection.  (

JADON HICKEY)





Course





- Laboratory


Result Diagrams: 


 17 14:05





 17 14:05





<JADON HICKEY - Last Filed: 17 15:13>





- Laboratory


Result Diagrams: 


 17 14:05





 17 14:05





<JENNIFER LIVE - Last Filed: 17 19:55>





- Re-evaluation


Re-evalutation: 


17 18:51


CBC shows slight leukocytosis of 13.4 otherwise no indicators of infection, CMP 

unremarkable with the exception of very minimally elevated AST and ALT, no 

evidence of dehydration, nothing to support her reports that she has been 

having 15 episodes of loose stools a day, thyroid some functions are normal, 

urinalysis shows moderate blood but 0 RBCs, more likely myoglobinuria.  

Pregnancy test is negative, chest x-ray is unremarkable.





When asked to provide a stool specimen the patient states she did not realize I 

wanted her to provide one here.  Patient states she could possibly provide one 

at home.  As the patient has been stating that she is having 15 loose bowel 

movements a day I am somewhat less concerned as she has been in this hospital 

for at least 3 hours without ever once needing to use the bathroom or being 

able to provide me with a bowel movement.  I do not suspect infectious diarrhea 

in this patient who is been having 15 loose bowel movements a day for several 

months now yet has continued to gain 25 pounds over the same time.





17 19:54


at the very end of her visit, patient was able to provide a stool sample on her 

way out.   (JENNIFER LIVE)





- Vital Signs


Vital signs: 


 











Temp Pulse Resp BP Pulse Ox


 


 98.6 F   86   16   132/98 H  98 


 


 17 13:47  17 13:47  17 13:47  17 13:47  17 13:47














- Laboratory


Laboratory results interpreted by me: 


 











  07/14/17 07/14/17 07/14/17





  14:05 14:05 14:05


 


WBC  13.4 H  


 


Absolute Neutrophils  10.3 H  


 


BUN   5 L 


 


AST   63 H 


 


ALT   114 H 


 


Urine Blood    MODERATE H














Discharge





<JADON HICKEY - Last Filed: 17 15:13>





<JENNIFER LIVE - Last Filed: 17 19:55>





- Discharge


Clinical Impression: 


 Chronic diarrhea, Pimples, Candidiasis of vulva





Condition: Stable


Disposition: HOME, SELF-CARE


Additional Instructions: 


For the diarrhea that you have been having for several months it is very 

important that you follow-up with a GI doctor as an outpatient.  I have given 

you the name of the GI doctor in the area that you should call to set up an 

appointment.  Please keep a log of your bowel movements and the foods that you 

have eaten that day.  This may help to identify a pattern of food that is 

causing her to have diarrhea.





For the vaginal yeast infection I did write you a prescription for Diflucan and 

you were given your first dose here.  If your yeast infection has not cleared 

up in 3 days please take the second dose of Diflucan.





For the "bumps" all of your body please use a salicylic acid containing body 

wash.





Please return to the emergency department for any new or concerning symptoms.


Prescriptions: 


Fluconazole [Diflucan] 150 mg PO ONCE PRN #1 tablet


 PRN Reason: 


Forms:  Follow-Up Laboratory Testing, Return to Work


Referrals: 


KANIKA GARRISON MD [ACTIVE STAFF] - Follow up in 1 month


Scribe Attestation: 





17 19:55


I personally performed the services described in the documentation, reviewed 

and edited the documentation which was dictated to the scribe in my presence, 

and it accurately records my words and actions. (JENNIFER LIVE)





Scribe Documentation





- Scribe


Written by Maisha:: maisha Gallegos, 2017, 1520


acting as scribe for :: Margret





<JADON HICKEY - Last Filed: 17 15:13>

## 2018-02-08 ENCOUNTER — HOSPITAL ENCOUNTER (INPATIENT)
Dept: HOSPITAL 62 - ER | Age: 38
LOS: 7 days | Discharge: HOME | DRG: 189 | End: 2018-02-15
Attending: INTERNAL MEDICINE | Admitting: INTERNAL MEDICINE
Payer: SELF-PAY

## 2018-02-08 DIAGNOSIS — J96.01: Primary | ICD-10-CM

## 2018-02-08 DIAGNOSIS — Z23: ICD-10-CM

## 2018-02-08 DIAGNOSIS — J18.1: ICD-10-CM

## 2018-02-08 DIAGNOSIS — F17.210: ICD-10-CM

## 2018-02-08 DIAGNOSIS — Z88.8: ICD-10-CM

## 2018-02-08 DIAGNOSIS — E66.9: ICD-10-CM

## 2018-02-08 DIAGNOSIS — L93.0: ICD-10-CM

## 2018-02-08 DIAGNOSIS — D86.9: ICD-10-CM

## 2018-02-08 DIAGNOSIS — E03.9: ICD-10-CM

## 2018-02-08 DIAGNOSIS — A60.04: ICD-10-CM

## 2018-02-08 DIAGNOSIS — Z80.9: ICD-10-CM

## 2018-02-08 DIAGNOSIS — Z83.3: ICD-10-CM

## 2018-02-08 DIAGNOSIS — Z79.899: ICD-10-CM

## 2018-02-08 LAB
ABSOLUTE LYMPHOCYTES# (MANUAL): 2.9 10^3/UL (ref 0.5–4.7)
ABSOLUTE MONOCYTES # (MANUAL): 0.7 10^3/UL (ref 0.1–1.4)
ABSOLUTE NEUTROPHILS# (MANUAL): 20.2 10^3/UL (ref 1.7–8.2)
ADD MANUAL DIFF: YES
ALBUMIN SERPL-MCNC: 5.5 G/DL (ref 3.5–5)
ALP SERPL-CCNC: 124 U/L (ref 38–126)
ALT SERPL-CCNC: 79 U/L (ref 9–52)
ANION GAP SERPL CALC-SCNC: 17 MMOL/L (ref 5–19)
APPEARANCE UR: CLEAR
APTT BLD: 32.5 SEC (ref 23.5–35.8)
APTT PPP: COLORLESS S
AST SERPL-CCNC: 54 U/L (ref 14–36)
BACTERIA (WET MOUNT): (no result)
BARBITURATES UR QL SCN: NEGATIVE
BASOPHILS NFR BLD MANUAL: 1 % (ref 0–2)
BILIRUB DIRECT SERPL-MCNC: 0.1 MG/DL (ref 0–0.4)
BILIRUB SERPL-MCNC: 0.7 MG/DL (ref 0.2–1.3)
BILIRUB UR QL STRIP: NEGATIVE
BUN SERPL-MCNC: 14 MG/DL (ref 7–20)
CALCIUM: 10.5 MG/DL (ref 8.4–10.2)
CHLAM PCR: NOT DETECTED
CHLORIDE SERPL-SCNC: 99 MMOL/L (ref 98–107)
CO2 SERPL-SCNC: 28 MMOL/L (ref 22–30)
EOSINOPHIL NFR BLD MANUAL: 0 % (ref 0–6)
EPITHELIALS (WET MOUNT): (no result)
ERYTHROCYTE [DISTWIDTH] IN BLOOD BY AUTOMATED COUNT: 13.8 % (ref 11.5–14)
GLUCOSE SERPL-MCNC: 83 MG/DL (ref 75–110)
GLUCOSE UR STRIP-MCNC: NEGATIVE MG/DL
GON PCR: NOT DETECTED
HCT VFR BLD CALC: 45.7 % (ref 36–47)
HGB BLD-MCNC: 15.5 G/DL (ref 12–15.5)
INR PPP: 0.93
KETONES UR STRIP-MCNC: NEGATIVE MG/DL
MAGNESIUM SERPL-MCNC: 2 MG/DL (ref 1.6–2.3)
MCH RBC QN AUTO: 30.3 PG (ref 27–33.4)
MCHC RBC AUTO-ENTMCNC: 34 G/DL (ref 32–36)
MCV RBC AUTO: 89 FL (ref 80–97)
METHADONE UR QL SCN: NEGATIVE
MONOCYTES % (MANUAL): 3 % (ref 3–13)
NITRITE UR QL STRIP: NEGATIVE
PCP UR QL SCN: NEGATIVE
PH UR STRIP: 6 [PH] (ref 5–9)
PLATELET # BLD: 431 10^3/UL (ref 150–450)
PLATELET COMMENT: ADEQUATE
POIKILOCYTOSIS BLD QL SMEAR: (no result)
POLYCHROMASIA BLD QL SMEAR: SLIGHT
POTASSIUM SERPL-SCNC: 3.9 MMOL/L (ref 3.6–5)
PROT SERPL-MCNC: 8.9 G/DL (ref 6.3–8.2)
PROT UR STRIP-MCNC: NEGATIVE MG/DL
PROTHROMBIN TIME: 13.1 SEC (ref 11.4–15.4)
RBC # BLD AUTO: 5.13 10^6/UL (ref 3.72–5.28)
SEGMENTED NEUTROPHILS % (MAN): 84 % (ref 42–78)
SODIUM SERPL-SCNC: 144.1 MMOL/L (ref 137–145)
SP GR UR STRIP: 1
STOMATOCYTES BLD QL SMEAR: (no result)
T.VAGINALIS (WET MOUNT): (no result)
TOTAL CELLS COUNTED BLD: 100
URINE AMPHETAMINES SCREEN: NEGATIVE
URINE BENZODIAZEPINES SCREEN: NEGATIVE
URINE COCAINE SCREEN: (no result)
URINE MARIJUANA (THC) SCREEN: NEGATIVE
UROBILINOGEN UR-MCNC: NEGATIVE MG/DL (ref ?–2)
VARIANT LYMPHS NFR BLD MANUAL: 12 % (ref 13–45)
WBC # BLD AUTO: 24 10^3/UL (ref 4–10.5)
WBCS (WET MOUNT): (no result)
YEAST (WET MOUNT): (no result)

## 2018-02-08 PROCEDURE — 83735 ASSAY OF MAGNESIUM: CPT

## 2018-02-08 PROCEDURE — 99285 EMERGENCY DEPT VISIT HI MDM: CPT

## 2018-02-08 PROCEDURE — 80307 DRUG TEST PRSMV CHEM ANLYZR: CPT

## 2018-02-08 PROCEDURE — 85730 THROMBOPLASTIN TIME PARTIAL: CPT

## 2018-02-08 PROCEDURE — 94668 MNPJ CHEST WALL SBSQ: CPT

## 2018-02-08 PROCEDURE — 36415 COLL VENOUS BLD VENIPUNCTURE: CPT

## 2018-02-08 PROCEDURE — 85025 COMPLETE CBC W/AUTO DIFF WBC: CPT

## 2018-02-08 PROCEDURE — 85610 PROTHROMBIN TIME: CPT

## 2018-02-08 PROCEDURE — 90686 IIV4 VACC NO PRSV 0.5 ML IM: CPT

## 2018-02-08 PROCEDURE — 80053 COMPREHEN METABOLIC PANEL: CPT

## 2018-02-08 PROCEDURE — 93010 ELECTROCARDIOGRAM REPORT: CPT

## 2018-02-08 PROCEDURE — 80048 BASIC METABOLIC PNL TOTAL CA: CPT

## 2018-02-08 PROCEDURE — 87591 N.GONORRHOEAE DNA AMP PROB: CPT

## 2018-02-08 PROCEDURE — 87491 CHLMYD TRACH DNA AMP PROBE: CPT

## 2018-02-08 PROCEDURE — 71275 CT ANGIOGRAPHY CHEST: CPT

## 2018-02-08 PROCEDURE — 85027 COMPLETE CBC AUTOMATED: CPT

## 2018-02-08 PROCEDURE — 96375 TX/PRO/DX INJ NEW DRUG ADDON: CPT

## 2018-02-08 PROCEDURE — 87040 BLOOD CULTURE FOR BACTERIA: CPT

## 2018-02-08 PROCEDURE — 84484 ASSAY OF TROPONIN QUANT: CPT

## 2018-02-08 PROCEDURE — 94799 UNLISTED PULMONARY SVC/PX: CPT

## 2018-02-08 PROCEDURE — 96365 THER/PROPH/DIAG IV INF INIT: CPT

## 2018-02-08 PROCEDURE — 93005 ELECTROCARDIOGRAM TRACING: CPT

## 2018-02-08 PROCEDURE — 94640 AIRWAY INHALATION TREATMENT: CPT

## 2018-02-08 PROCEDURE — 96361 HYDRATE IV INFUSION ADD-ON: CPT

## 2018-02-08 PROCEDURE — 81001 URINALYSIS AUTO W/SCOPE: CPT

## 2018-02-08 PROCEDURE — 71045 X-RAY EXAM CHEST 1 VIEW: CPT

## 2018-02-08 PROCEDURE — 84702 CHORIONIC GONADOTROPIN TEST: CPT

## 2018-02-08 PROCEDURE — 87210 SMEAR WET MOUNT SALINE/INK: CPT

## 2018-02-08 RX ADMIN — METHYLPREDNISOLONE SODIUM SUCCINATE SCH MG: 40 INJECTION, POWDER, FOR SOLUTION INTRAMUSCULAR; INTRAVENOUS at 22:29

## 2018-02-08 RX ADMIN — SODIUM CHLORIDE PRN ML: 9 INJECTION, SOLUTION INTRAVENOUS at 09:51

## 2018-02-08 RX ADMIN — VALACYCLOVIR HYDROCHLORIDE SCH MG: 500 TABLET, FILM COATED ORAL at 22:29

## 2018-02-08 RX ADMIN — SODIUM CHLORIDE PRN ML: 9 INJECTION, SOLUTION INTRAVENOUS at 13:17

## 2018-02-08 RX ADMIN — IPRATROPIUM BROMIDE AND ALBUTEROL SULFATE SCH ML: 2.5; .5 SOLUTION RESPIRATORY (INHALATION) at 15:04

## 2018-02-08 RX ADMIN — HEPARIN SODIUM SCH UNIT: 5000 INJECTION, SOLUTION INTRAVENOUS; SUBCUTANEOUS at 22:30

## 2018-02-08 RX ADMIN — OXYCODONE AND ACETAMINOPHEN PRN TAB: 5; 325 TABLET ORAL at 22:30

## 2018-02-08 RX ADMIN — HEPARIN SODIUM SCH UNIT: 5000 INJECTION, SOLUTION INTRAVENOUS; SUBCUTANEOUS at 15:05

## 2018-02-08 RX ADMIN — IPRATROPIUM BROMIDE AND ALBUTEROL SULFATE SCH ML: 2.5; .5 SOLUTION RESPIRATORY (INHALATION) at 20:04

## 2018-02-08 NOTE — RADIOLOGY REPORT (SQ)
EXAM DESCRIPTION:  CHEST SINGLE VIEW



COMPLETED DATE/TIME:  2/8/2018 8:54 am



REASON FOR STUDY:  sob



COMPARISON:  7/14/2017.  CT of chest without contrast 1/17/2017.



EXAM PARAMETERS:  NUMBER OF VIEWS: One view.

TECHNIQUE: Single frontal radiographic view of the chest acquired.

RADIATION DOSE: NA

LIMITATIONS: None.



FINDINGS:  LUNGS AND PLEURA: On comparison to prior portable chest and CT of chest, there is again bi
lateral ground-glass type infiltrates in a perihilar distribution.  Calcified granuloma noted at righ
t lung base.

MEDIASTINUM AND HILAR STRUCTURES: Calcified right perihilar nodes.

HEART AND VASCULAR STRUCTURES: Heart is normal with normal pulmonary vasculature.

BONES: No acute findings.

HARDWARE: None in the chest.

OTHER: No other significant finding.



IMPRESSION:  No significant interval change



TECHNICAL DOCUMENTATION:  JOB ID:  [id number]

[id number]

 2011 [employer]- All Rights Reserved

## 2018-02-08 NOTE — PDOC H&P
History of Present Illness


Admission Date/PCP: 





2018


Patient complains of: Feeling sick for the past couple of months


History of Present Illness: 


LUCIUS LILLY is a 37 year old female presents to emergency roomcomplaining 

of shortness of breath, productive cough off/on, chills and pain under her rib 

cage for the past two months. She had been cutting down smoking to four 

cigarettes per day. Patient admits as to having history of lupus and 

sarcoidosis but is not being treated because of lack of insurance. When 

evaluated in ED, she desaturated to 85% on ambulation and required oxygen 

supplementation. Patient denies doing cocaine but marijuana. Recently she had a 

vaginal exam and told that that she has some ulcers that were tested and were 

not herpes. Patient was evaluated thru CTA of chest and do to findings in 

addition to patient desaturating on ambulation, the hospitalist service was 

contacted for further management








Past Medical History


Cardiac Medical History: Reports: None


Pulmonary Medical History: Reports: Other - sarcoidosis


EENT Medical History: Reports: None


Neurological Medical History: Reports: None


Endocrine Medical History: Reports: Hypothyroidism


Renal/ Medical History: Reports: None


Malignancy Medical History: Reports: None


GI Medical History: Reports: None


Musculoskeltal Medical History: Reports: Other - Lupus


Skin Medical History: Reports: None


Psychiatric Medical History: Reports: Tobacco Dependency


Traumatic Medical History: Reports: None


Hematology: Reports: None


Infectious Medical History: Reports: None





Past Surgical History


Past Surgical History: Reports:  Section - 3, Tonsillectomy, Other - 

Bilateral tubal ligation





Social History


Smoking Status: Current Every Day Smoker


Frequency of Alcohol Use: None


Hx Recreational Drug Use: Yes


Drugs: Marijuana


Hx Prescription Drug Abuse: No





- Advance Directive


Resuscitation Status: Full Code





Family History


Family History: DM, Malignancy, Thyroid Disfunction


Parental Family History Reviewed: Yes


Children Family History Reviewed: Yes


Sibling(s) Family History Reviewed.: Yes





Medication/Allergy


Home Medications: 








Hydrocodone/Acetaminophen [Norco 5-325 Tablet] 1 each PO QID #15 tablet  


Phenazopyridine HCl [Pyridium 200 mg Tablet] 200 mg PO TID #15 tablet 16 


Sulfamethoxazole/Trimethoprim [Bactrim Ds Tablet] 1 each PO BID #20 tablet  


Ketorolac Tromethamine [Toradol 10 mg Tablet] 10 mg PO Q6HP PRN #20 tablet 01/15

/17 


Azithromycin [Zithromax 250 mg Tablet] 250 mg PO ASDIR PRN #6 tablet 17 


Cyclobenzaprine HCl [Flexeril 5 mg Tablet] 5 mg PO TID PRN #15 tablet 17 


Oxycodone HCl/Acetaminophen [Percocet 5-325 mg Tablet] 1 - 2 tab PO ASDIR PRN #

15 tablet 17 


Fluconazole [Diflucan] 150 mg PO ONCE PRN #1 tablet 17 








Allergies/Adverse Reactions: 


 





naproxen [From Aleve] Allergy (Verified 17 13:52)


 











Review of Systems


Constitutional: PRESENT: chills.  ABSENT: fever(s), headache(s), night sweats, 

weakness


Eyes: ABSENT: visual disturbances


Ears: ABSENT: hearing changes


Nose, Mouth, and Throat: ABSENT: headache(s), mouth pain, sore throat


Cardiovascular: PRESENT: chest pain, dyspnea on exertion.  ABSENT: palpitations


Respiratory: PRESENT: cough, dyspnea


Gastrointestinal: ABSENT: abdominal pain, nausea, vomiting


Genitourinary: ABSENT: dysuria, hematuria


Musculoskeletal: ABSENT: joint swelling, muscle weakness


Neurological: ABSENT: dizziness, focal weakness, frequent falls





Physical Exam


Vital Signs: 


 











Temp Pulse Resp BP Pulse Ox


 


 97.7 F   129 H  23 H  107/55 L  90 L


 


 18 06:54  18 06:54  18 13:01  18 13:01  18 13:01








 Intake & Output











 18





 06:59 06:59 06:59


 


Weight  93.2 kg 











General appearance: PRESENT: no acute distress, cooperative, obese


Head exam: PRESENT: atraumatic, normocephalic


Eye exam: PRESENT: EOMI, PERRLA.  ABSENT: nystagmus


Ear exam: PRESENT: normal external ear exam


Mouth exam: PRESENT: moist


Neck exam: PRESENT: full ROM.  ABSENT: JVD, lymphadenopathy, tenderness, 

thyromegaly


Respiratory exam: PRESENT: crackles, wheezes


Cardiovascular exam: PRESENT: RRR.  ABSENT: diastolic murmur, systolic murmur


Vascular exam: PRESENT: normal capillary refill


GI/Abdominal exam: PRESENT: normal bowel sounds, soft.  ABSENT: tenderness


Extremities exam: PRESENT: full ROM.  ABSENT: clubbing, joint swelling, pedal 

edema


Musculoskeletal exam: PRESENT: ambulatory


Neurological exam: PRESENT: alert, awake, oriented to person, oriented to place

, oriented to time, oriented to situation, CN II-XII grossly intact


Psychiatric exam: PRESENT: appropriate affect, normal mood


Skin exam: PRESENT: erythema - noted to cheeks, normal color





Results


Laboratory Results: 


 





 18 09:47 





 18 09:47 





 











  18





  06:50 09:47 09:47


 


WBC   24.0 H 


 


RBC   5.13 


 


Hgb   15.5 


 


Hct   45.7 


 


MCV   89 


 


MCH   30.3 


 


MCHC   34.0 


 


RDW   13.8 


 


Plt Count   431 


 


Seg Neutrophils %   Not Reportable 


 


Lymphocytes %   Not Reportable 


 


Monocytes %   Not Reportable 


 


Eosinophils %   Not Reportable 


 


Basophils %   Not Reportable 


 


Absolute Neutrophils   Not Reportable 


 


Absolute Lymphocytes   Not Reportable 


 


Absolute Monocytes   Not Reportable 


 


Absolute Eosinophils   Not Reportable 


 


Absolute Basophils   Not Reportable 


 


Sodium    144.1


 


Potassium    3.9


 


Chloride    99


 


Carbon Dioxide    28


 


Anion Gap    17


 


BUN    14


 


Creatinine    0.81


 


Est GFR ( Amer)    > 60


 


Est GFR (Non-Af Amer)    > 60


 


Glucose    83


 


Calcium    10.5 H


 


Magnesium    2.0


 


Total Bilirubin    0.7


 


AST    54 H


 


ALT    79 H


 


Alkaline Phosphatase    124


 


Total Protein    8.9 H


 


Albumin    5.5 H


 


Urine Color  COLORLESS  


 


Urine Appearance  CLEAR  


 


Urine pH  6.0  


 


Ur Specific Gravity  1.002  


 


Urine Protein  NEGATIVE  


 


Urine Glucose (UA)  NEGATIVE  


 


Urine Ketones  NEGATIVE  


 


Urine Blood  NEGATIVE  


 


Urine Nitrite  NEGATIVE  


 


Ur Leukocyte Esterase  NEGATIVE  


 


Urine WBC (Auto)  1  


 


Urine RBC (Auto)  1  








 











  18





  09:47


 


Troponin I  < 0.012











Impressions: 


 





Chest X-Ray  18 08:20


IMPRESSION:  No significant interval change


 








Chest/Abdomen CTA  18 08:30


IMPRESSION:  No evidence for pulmonary embolic disease.  Diffuse bilateral 

ground-glass opacities are identified with a more consolidative process being 

identified in the right mid lung field.  No pleural effusions are identified.  

Differential possibilities would include pulmonary edema on a cardiogenic or 

noncardiogenic basis.  In light of the patient's clinical history this could be 

a possible drug reaction.  This could also represent a hypersensitivity 

pneumonitis.  The possibility of an infectious process cannot be excluded.  

Clinical correlation is recommended.  Old granulomatous disease.  Other 

findings as noted above.


 














Assessment & Plan





- Diagnosis


(1) Acute respiratory failure


Is this a current diagnosis for this admission?: Yes   


Plan: 


Will supplement oxygen and will wean off as tolerated








(2) Vaginitis


Qualifiers: 


   Chronicity: chronic   Qualified Code(s): N76.1 - Subacute and chronic 

vaginitis   


Is this a current diagnosis for this admission?: Yes   


Plan: 


According to  patient's description is appears to be aphthous ulcers.  For now 

will treat with Valtrex and will follow up response








(3) Personal history of sarcoidosis


Is this a current diagnosis for this admission?: Yes   


Plan: 


There are some areas in the CTA of the long that may suggest granulomatous 

disease.  Patient will be placed on IV steroids








(4) Lupus


Qualifiers: 


   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 

lupus erythematosus   


Is this a current diagnosis for this admission?: Yes   


Plan: 


Patient will be placed on IV steroids








(5) PNA (pneumonia)


Qualifiers: 


   Pneumonia type: due to unspecified organism   Laterality: unspecified 

laterality   Lung location: unspecified part of lung   Qualified Code(s): J18.9 

- Pneumonia, unspecified organism   


Is this a current diagnosis for this admission?: Yes   


Plan: 


Differential is quite ample however patient will be placed on Levaquin IV and 

IV steroids.  Will consult pulmonary service since might be dealing with 

pneumonitis








- Time


Time Spent: 50 to 70 Minutes


Medications reviewed and adjusted accordingly: Yes


Anticipated discharge: Home


Within: within 72 hours





- Inpatient Certification


Based on my medical assessment, after consideration of the patient's 

comorbidities, presenting symptoms, or acuity I expect that the services needed 

warrant INPATIENT care.: Yes


I certify that my determination is in accordance with my understanding of 

Medicare's requirements for reasonable and necessary INPATIENT services [42 CFR 

412.3e].: Yes


Medical Necessity: Need Close Monitoring Due to Risk of Patient Decompensation, 

Need for IV Antibiotics - Oxygen supplementation

## 2018-02-08 NOTE — EKG REPORT
SEVERITY:- ABNORMAL ECG -

SINUS TACHYCARDIA

PRACHI, CONSIDER BIATRIAL ABNORMALITIES

PROBABLE LVH WITH SECONDARY REPOL ABNRM

:

Confirmed by: Nigel Roth 08-Feb-2018 09:17:21

## 2018-02-08 NOTE — ER DOCUMENT REPORT
ED General





- General


Chief Complaint: Shortness Of Breath


Stated Complaint: FATIGUE


Time Seen by Provider: 18 08:20


TRAVEL OUTSIDE OF THE [place] IN LAST 30 DAYS: No





- HPI


Patient complains to provider of: Multiple complaints


Notes: 





Patient coming in for evaluation of multiple issues.  Patient states she has a 

history of lupus denies any recent travel denies fevers chills nausea vomiting 

however states that she has had exercise intolerance becoming very fatigued and 

experiencing dyspnea with walking states significant short of breath even when 

performing a light activity.  States cough is nonproductive.  Patient also 

complains of diffuse joint pain.  Patient also states that she has lesions on 

her vagina and a vaginal discharge.  Patient denies any primary CARE states she 

has not followed up with me by approximately 1 year.  Other than lupus patient 

states that she does smoke cigarettes but has no other medical issues.





- Related Data


Allergies/Adverse Reactions: 


 





naproxen [From Aleve] Allergy (Verified 17 13:52)


 











Past Medical History





- Social History


Smoking Status: Current Every Day Smoker


Family History: Reviewed & Not Pertinent, Malignancy, Thyroid Disfunction


Patient has suicidal ideation: No


Patient has homicidal ideation: No


Endocrine Medical History: Reports: Hx Hypothyroidism


Renal/ Medical History: Denies: Hx Peritoneal Dialysis


Past Surgical History: Reports: Hx Breast Surgery, Hx  Section - 3, Hx 

Nose Surgery - Deviated septum, Hx Tonsillectomy





- Immunizations


Immunizations up to date: Yes


Hx Diphtheria, Pertussis, Tetanus Vaccination: Yes





Review of Systems





- Review of Systems


Constitutional: Weakness


EENT: No symptoms reported


Cardiovascular: No symptoms reported


Respiratory: Cough, Short of breath, Wheezing


Gastrointestinal: No symptoms reported


Genitourinary: No symptoms reported


Female Genitourinary: Vaginal discharge


Musculoskeletal: No symptoms reported


Skin: No symptoms reported


Hematologic/Lymphatic: No symptoms reported


Neurological/Psychological: No symptoms reported


-: Yes All other systems reviewed and negative





Physical Exam





- Vital signs


Vitals: 


 











Temp Pulse Resp BP Pulse Ox


 


 97.7 F   129 H  20   117/79   93 


 


 18 06:54  18 06:54  18 06:54  18 06:54  18 06:54











Interpretation: Normal





- General


General appearance: Appears well, Alert





- HEENT


Head: Normocephalic, Atraumatic


Eyes: Normal


Pupils: PERRL





- Respiratory


Respiratory status: No respiratory distress


Chest status: Nontender


Breath sounds: Rhonchi, Wheezing


Chest palpation: Normal





- Cardiovascular


Rhythm: Regular


Heart sounds: Normal auscultation


Murmur: No





- Abdominal


Inspection: Normal


Distension: No distension


Bowel sounds: Normal


Tenderness: Nontender


Organomegaly: No organomegaly





- Genitourinary


External exam: Vesicles, Other - On the left border of the labia there is 

ulceration at the entrance of the vagina and introitus there is also a few 

ulcerations consistent with more likely genital herpes


Speculum exam: Other - Scant white discharge


Vaginal bleeding: None


Bimanuel exam: Normal





- Back


Back: Normal, Nontender





- Extremities


General upper extremity: Normal inspection, Nontender, Normal color, Normal ROM

, Normal temperature


General lower extremity: Normal inspection, Nontender, Normal color, Normal ROM

, Normal temperature, Normal weight bearing.  No: Charan's sign





- Neurological


Neuro grossly intact: Yes


Cognition: Normal


Orientation: AAOx4


[place] Coma Scale Eye Opening: Spontaneous


[place] Coma Scale Verbal: Oriented


[place] Coma Scale Motor: Obeys Commands


Hilda Coma Scale Total: 15


Speech: Normal


Motor strength normal: LUE, RUE, LLE, RLE


Sensory: Normal





- Psychological


Associated symptoms: Normal affect, Normal mood





- Skin


Skin Temperature: Warm


Skin Moisture: Dry


Skin Color: Normal





Course





- Re-evaluation


Re-evalutation: 





18 15:23


Patient's laboratory findings showed leukocytosis.  CT scan shows diffuse lung 

disease with leukocytosis and patient's symptoms are well treated for possible 

pneumonia.  Patient did return positive for cocaine however upon asking the 

patient as she performs a cane patient stated no.  I did explain to the patient 

that this may change her treatment however patient reiterated she does not do 

any cocaine states that she  does do marijuana.





We will start the patient on a dose of Levaquin other etiologies could be from 

her patient's lupus chronic inflammation do not think the patient has influenza 

at this time she is afebrile.





Vaginal examination did show signs of possible genital herpes.  We did not 

perform any HSV swabs.  The wet mount this showed bacterial vaginosis.  We will 

start the patient on treatment for this.  





 patient was ambulated around the ER to become very hypoxic with SPO2 in the 

lower 80s.  Therefore patient will meet criteria for admission.  Patient's 

heart rate has improved since receiving IV fluids.  Discussed with hospitalist 

will admit the patient





- Vital Signs


Vital signs: 


 











Temp Pulse Resp BP Pulse Ox


 


 97.7 F   129 H  14   107/55 L  87 L


 


 18 06:54  18 06:54  18 14:00  18 13:01  18 15:05














- Laboratory


Result Diagrams: 


 18 09:47





 18 09:47


Laboratory results interpreted by me: 


 











  18





  09:47 09:47


 


WBC  24.0 H 


 


Seg Neuts % (Manual)  84 H 


 


Lymphocytes % (Manual)  12 L 


 


Abs Neuts (Manual)  20.2 H 


 


Calcium   10.5 H


 


AST   54 H


 


ALT   79 H


 


Total Protein   8.9 H


 


Albumin   5.5 H














Discharge





- Discharge


Clinical Impression: 


 Hypoxia, Bacterial vaginosis





PNA (pneumonia)


Qualifiers:


 Pneumonia type: due to unspecified organism Laterality: unspecified laterality 

Lung location: unspecified part of lung Qualified Code(s): J18.9 - Pneumonia, 

unspecified organism





Lupus


Qualifiers:


 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 

erythematosus





Acute respiratory failure


Qualifiers:


 Respiratory failure complication: hypoxia Qualified Code(s): J96.01 - Acute 

respiratory failure with hypoxia





Condition: Good


Disposition: ADMITTED AS INPATIENT


Admitting Provider: Hospitalist Lydia Emanuel


Unit Admitted: Medical Floor

## 2018-02-08 NOTE — RADIOLOGY REPORT (SQ)
EXAM DESCRIPTION:  CTA CHEST



COMPLETED DATE/TIME:  2/8/2018 10:19 am



REASON FOR STUDY:  chest pain tachy sob hx lupus



COMPARISON:  Non contrasted chest CT scan dated January 2017 and chest x-ray dated 2/8/2018



TECHNIQUE:  CT scan of the chest performed using helical scanning technique with dynamic intravenous 
contrast injection.  Images reviewed with lung, soft tissue and bone windows.  Reconstructed coronal 
and sagittal MPR images reviewed.

Additional 3 dimensional post-processing performed to develop Maximal Intensity Projection images (MI
P).  All images stored on PACS.

All CT scanners at this facility use dose modulation, iterative reconstruction, and/or weight based d
osing when appropriate to reduce radiation dose to as low as reasonably achievable (ALARA).

[facility]: Dose Right  [facility]: CareDose    [facility]: Dose Right    [facility]: Teradose 4D    [facility]: Smart Technologies



CONTRAST TYPE AND DOSE:  contrast/concentration: Isovue 370.00 mg/ml; Total Contrast Delivered: 78.0 
ml; Total Saline Delivered: 85.1 ml

Contrast bolus optimized for the pulmonary arteries. Not diagnostic for the aorta.



RENAL FUNCTION:  None required. The patient is less than 50 years old.



RADIATION DOSE:  CT Rad equipment meets quality standard of care and radiation dose reduction techniq
ues were employed. CTDIvol: 16.5 - 20.1 mGy. DLP: 790 mGy-cm. .



LIMITATIONS:  None.



FINDINGS:  LUNGS AND PLEURA: Diffuse bilateral ground-glass opacities are identified.  A more consoli
dative process is identified in the right mid lung field.  No pleural effusions are identified.  A pl
eural-based calcific density is identified in the posterior sulcus on the right with a small adjacent
 calcified granuloma.  The calcifications were present on the previous CT scan.

AORTA AND GREAT VESSELS: No aneurysm.  Contrast bolus not optimized for the aorta.

HEART: No pericardial effusion. No significant coronary artery calcifications.

PULMONARY ARTERIES: No emboli visualized in the main pulmonary arteries or the segmental branches.

HILAR AND MEDIASTINAL STRUCTURES: There are enlarged mediastinal and bilateral hilar lymph nodes.  Ca
lcified right hilar and subcarinal lymph nodes are identified.

HARDWARE: None in the chest.

UPPER ABDOMEN: There is diffuse fatty infiltration of the liver.

THYROID AND OTHER SOFT TISSUES: No masses.  No adenopathy.

BONES: No acute or significant finding.

3D MIPS: Confirm above findings.

OTHER: No other significant finding.



IMPRESSION:  No evidence for pulmonary embolic disease.  Diffuse bilateral ground-glass opacities are
 identified with a more consolidative process being identified in the right mid lung field.  No pleur
al effusions are identified.  Differential possibilities would include pulmonary edema on a cardiogen
ic or noncardiogenic basis.  In light of the patient's clinical history this could be a possible drug
 reaction.  This could also represent a hypersensitivity pneumonitis.  The possibility of an infectio
us process cannot be excluded.  Clinical correlation is recommended.  Old granulomatous disease.  Oth
er findings as noted above.



COMMENT:  Quality ID # [id number]: Final reports with documentation of one or more dose reduction techniques
 (e.g., Automated exposure control, adjustment of the mA and/or kV according to patient size, use of 
iterative reconstruction technique)



TECHNICAL DOCUMENTATION:  JOB ID:  [id number]

 2011 [employer]- All Rights Reserved

## 2018-02-09 LAB
ABSOLUTE LYMPHOCYTES# (MANUAL): 1 10^3/UL (ref 0.5–4.7)
ABSOLUTE MONOCYTES # (MANUAL): 0.2 10^3/UL (ref 0.1–1.4)
ABSOLUTE NEUTROPHILS# (MANUAL): 19.4 10^3/UL (ref 1.7–8.2)
ADD MANUAL DIFF: YES
ANION GAP SERPL CALC-SCNC: 9 MMOL/L (ref 5–19)
BASOPHILS NFR BLD MANUAL: 0 % (ref 0–2)
BUN SERPL-MCNC: 10 MG/DL (ref 7–20)
CALCIUM: 10 MG/DL (ref 8.4–10.2)
CHLORIDE SERPL-SCNC: 107 MMOL/L (ref 98–107)
CO2 SERPL-SCNC: 28 MMOL/L (ref 22–30)
EOSINOPHIL NFR BLD MANUAL: 0 % (ref 0–6)
ERYTHROCYTE [DISTWIDTH] IN BLOOD BY AUTOMATED COUNT: 13.7 % (ref 11.5–14)
GLUCOSE SERPL-MCNC: 156 MG/DL (ref 75–110)
HCT VFR BLD CALC: 42.1 % (ref 36–47)
HGB BLD-MCNC: 14.1 G/DL (ref 12–15.5)
MAGNESIUM SERPL-MCNC: 2.3 MG/DL (ref 1.6–2.3)
MCH RBC QN AUTO: 30.1 PG (ref 27–33.4)
MCHC RBC AUTO-ENTMCNC: 33.4 G/DL (ref 32–36)
MCV RBC AUTO: 90 FL (ref 80–97)
MONOCYTES % (MANUAL): 1 % (ref 3–13)
NEUTS BAND NFR BLD MANUAL: 1 % (ref 3–5)
PLATELET # BLD: 346 10^3/UL (ref 150–450)
PLATELET COMMENT: ADEQUATE
POTASSIUM SERPL-SCNC: 4.4 MMOL/L (ref 3.6–5)
RBC # BLD AUTO: 4.67 10^6/UL (ref 3.72–5.28)
RBC MORPH BLD: (no result)
SEGMENTED NEUTROPHILS % (MAN): 93 % (ref 42–78)
SODIUM SERPL-SCNC: 144.4 MMOL/L (ref 137–145)
TOTAL CELLS COUNTED BLD: 100
VARIANT LYMPHS NFR BLD MANUAL: 5 % (ref 13–45)
WBC # BLD AUTO: 20.6 10^3/UL (ref 4–10.5)

## 2018-02-09 PROCEDURE — 3E0F73Z INTRODUCTION OF ANTI-INFLAMMATORY INTO RESPIRATORY TRACT, VIA NATURAL OR ARTIFICIAL OPENING: ICD-10-PCS | Performed by: FAMILY MEDICINE

## 2018-02-09 RX ADMIN — ZOLPIDEM TARTRATE PRN MG: 5 TABLET ORAL at 23:17

## 2018-02-09 RX ADMIN — VALACYCLOVIR HYDROCHLORIDE SCH MG: 500 TABLET, FILM COATED ORAL at 10:02

## 2018-02-09 RX ADMIN — METHYLPREDNISOLONE SODIUM SUCCINATE SCH MG: 40 INJECTION, POWDER, FOR SOLUTION INTRAMUSCULAR; INTRAVENOUS at 06:22

## 2018-02-09 RX ADMIN — IPRATROPIUM BROMIDE AND ALBUTEROL SULFATE SCH ML: 2.5; .5 SOLUTION RESPIRATORY (INHALATION) at 13:21

## 2018-02-09 RX ADMIN — LEVOFLOXACIN SCH ML: 750 INJECTION, SOLUTION INTRAVENOUS at 12:22

## 2018-02-09 RX ADMIN — METHYLPREDNISOLONE SODIUM SUCCINATE SCH MG: 40 INJECTION, POWDER, FOR SOLUTION INTRAMUSCULAR; INTRAVENOUS at 14:08

## 2018-02-09 RX ADMIN — VALACYCLOVIR HYDROCHLORIDE SCH MG: 500 TABLET, FILM COATED ORAL at 22:55

## 2018-02-09 RX ADMIN — DOCUSATE SODIUM SCH MG: 100 CAPSULE, LIQUID FILLED ORAL at 10:05

## 2018-02-09 RX ADMIN — OXYCODONE AND ACETAMINOPHEN PRN TAB: 5; 325 TABLET ORAL at 18:47

## 2018-02-09 RX ADMIN — OXYCODONE AND ACETAMINOPHEN PRN TAB: 5; 325 TABLET ORAL at 10:22

## 2018-02-09 RX ADMIN — METHYLPREDNISOLONE SODIUM SUCCINATE SCH MG: 40 INJECTION, POWDER, FOR SOLUTION INTRAMUSCULAR; INTRAVENOUS at 22:55

## 2018-02-09 RX ADMIN — IPRATROPIUM BROMIDE AND ALBUTEROL SULFATE SCH ML: 2.5; .5 SOLUTION RESPIRATORY (INHALATION) at 08:09

## 2018-02-09 RX ADMIN — HEPARIN SODIUM SCH UNIT: 5000 INJECTION, SOLUTION INTRAVENOUS; SUBCUTANEOUS at 10:22

## 2018-02-09 RX ADMIN — IPRATROPIUM BROMIDE AND ALBUTEROL SULFATE SCH ML: 2.5; .5 SOLUTION RESPIRATORY (INHALATION) at 19:42

## 2018-02-09 RX ADMIN — HEPARIN SODIUM SCH UNIT: 5000 INJECTION, SOLUTION INTRAVENOUS; SUBCUTANEOUS at 18:30

## 2018-02-09 NOTE — PDOC PROGRESS REPORT
Subjective


Progress Note for:: 02/09/18


Subjective:: 





Patient refers that her breathing is basically the same.





Review of systems


All organ systems evaluated and negative except as in subjective





All significant laboratories and diagnostics have been reviewed


Reason For Visit: 


ACUTE RESPIRATORY FAILURE








Physical Exam


Vital Signs: 


 











Temp Pulse Resp BP Pulse Ox


 


 97.6 F   84   16   106/51 L  94 


 


 02/08/18 20:47  02/09/18 08:09  02/09/18 08:09  02/09/18 08:00  02/09/18 08:09








 Intake & Output











 02/08/18 02/09/18 02/10/18





 06:59 06:59 06:59


 


Intake Total  200 


 


Balance  200 











General appearance: PRESENT: cooperative, obese


Head exam: PRESENT: atraumatic


Eye exam: PRESENT: EOMI, PERRLA


Ear exam: PRESENT: normal external ear exam, TM's normal bilaterally


Mouth exam: PRESENT: moist


Neck exam: PRESENT: full ROM, JVD.  ABSENT: lymphadenopathy


Respiratory exam: PRESENT: clear to auscultation kurtis.  ABSENT: tachypnea, 

unlabored


Cardiovascular exam: PRESENT: RRR.  ABSENT: diastolic murmur, systolic murmur


Vascular exam: PRESENT: normal capillary refill


GI/Abdominal exam: PRESENT: normal bowel sounds, soft.  ABSENT: tenderness


Extremities exam: PRESENT: full ROM.  ABSENT: joint swelling, pedal edema


Musculoskeletal exam: ABSENT: ambulatory, deformity


Neurological exam: PRESENT: alert, awake, oriented to person, oriented to time, 

oriented to situation, CN II-XII grossly intact


Psychiatric exam: PRESENT: appropriate affect, normal mood


Skin exam: PRESENT: intact, normal color





Results


Laboratory Results: 


 





 02/09/18 06:53 





 02/09/18 06:53 





 











  02/09/18 02/09/18





  06:53 06:53


 


WBC  20.6 H 


 


RBC  4.67 


 


Hgb  14.1 


 


Hct  42.1 


 


MCV  90 


 


MCH  30.1 


 


MCHC  33.4 


 


RDW  13.7 


 


Plt Count  346 


 


Seg Neutrophils %  Not Reportable 


 


Lymphocytes %  Not Reportable 


 


Monocytes %  Not Reportable 


 


Eosinophils %  Not Reportable 


 


Basophils %  Not Reportable 


 


Absolute Neutrophils  Not Reportable 


 


Absolute Lymphocytes  Not Reportable 


 


Absolute Monocytes  Not Reportable 


 


Absolute Eosinophils  Not Reportable 


 


Absolute Basophils  Not Reportable 


 


Sodium   144.4


 


Potassium   4.4


 


Chloride   107


 


Carbon Dioxide   28


 


Anion Gap   9


 


BUN   10


 


Creatinine   0.54


 


Est GFR ( Amer)   > 60


 


Est GFR (Non-Af Amer)   > 60


 


Glucose   156 H


 


Calcium   10.0


 


Magnesium   2.3











Impressions: 


 





Chest X-Ray  02/08/18 08:20


IMPRESSION:  No significant interval change


 








Chest/Abdomen CTA  02/08/18 08:30


IMPRESSION:  No evidence for pulmonary embolic disease.  Diffuse bilateral 

ground-glass opacities are identified with a more consolidative process being 

identified in the right mid lung field.  No pleural effusions are identified.  

Differential possibilities would include pulmonary edema on a cardiogenic or 

noncardiogenic basis.  In light of the patient's clinical history this could be 

a possible drug reaction.  This could also represent a hypersensitivity 

pneumonitis.  The possibility of an infectious process cannot be excluded.  

Clinical correlation is recommended.  Old granulomatous disease.  Other 

findings as noted above.


 














Assessment & Plan





- Diagnosis


(1) Acute respiratory failure


Qualifiers: 


   Respiratory failure complication: hypoxia   Qualified Code(s): J96.01 - 

Acute respiratory failure with hypoxia   


Is this a current diagnosis for this admission?: Yes   


Plan: 


Continue oxygen supplementation and will wean off as tolerated








(2) Vaginitis


Qualifiers: 


   Chronicity: chronic   Qualified Code(s): N76.1 - Subacute and chronic 

vaginitis   


Is this a current diagnosis for this admission?: Yes   


Plan: 


According to  patient's description is appears to be aphthous ulcers.  Continue 

Valtrex








(3) Personal history of sarcoidosis


Is this a current diagnosis for this admission?: Yes   


Plan: 


There are some areas in the CTA of the long that may suggest granulomatous 

disease.  Continue IV steroids.  Dr. Sandra consulted








(4) Lupus


Qualifiers: 


   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 

lupus erythematosus   


Is this a current diagnosis for this admission?: Yes   


Plan: 


Continue IV steroids








(5) PNA (pneumonia)


Qualifiers: 


   Pneumonia type: due to unspecified organism   Laterality: unspecified 

laterality   Lung location: unspecified part of lung   Qualified Code(s): J18.9 

- Pneumonia, unspecified organism   


Is this a current diagnosis for this admission?: Yes   


Plan: 


Differential is quite ample however patient. Continue Levaquin IV and IV 

steroids.  Dr. Sandra consulted








- Time


Time Spent with patient: 15-24 minutes


Medications reviewed and adjusted accordingly: Yes


Anticipated discharge: Home


Within: within 72 hours





- Inpatient Certification


Based on my medical assessment, after consideration of the patient's 

comorbidities, presenting symptoms, or acuity I expect that the services needed 

warrant INPATIENT care.: Yes


I certify that my determination is in accordance with my understanding of 

Medicare's requirements for reasonable and necessary INPATIENT services [42 CFR 

412.3e].: Yes


Medical Necessity: Need for Nebulizer Therapy and Monitoring of Response, Need 

for IV Antibiotics

## 2018-02-10 RX ADMIN — METHYLPREDNISOLONE SODIUM SUCCINATE SCH MG: 40 INJECTION, POWDER, FOR SOLUTION INTRAMUSCULAR; INTRAVENOUS at 05:27

## 2018-02-10 RX ADMIN — IPRATROPIUM BROMIDE AND ALBUTEROL SULFATE SCH ML: 2.5; .5 SOLUTION RESPIRATORY (INHALATION) at 20:10

## 2018-02-10 RX ADMIN — HEPARIN SODIUM SCH UNIT: 5000 INJECTION, SOLUTION INTRAVENOUS; SUBCUTANEOUS at 10:11

## 2018-02-10 RX ADMIN — OXYCODONE AND ACETAMINOPHEN PRN TAB: 5; 325 TABLET ORAL at 20:34

## 2018-02-10 RX ADMIN — DOCUSATE SODIUM SCH MG: 100 CAPSULE, LIQUID FILLED ORAL at 10:11

## 2018-02-10 RX ADMIN — LANSOPRAZOLE SCH MG: 30 TABLET, ORALLY DISINTEGRATING, DELAYED RELEASE ORAL at 18:12

## 2018-02-10 RX ADMIN — LEVOFLOXACIN SCH ML: 750 INJECTION, SOLUTION INTRAVENOUS at 11:58

## 2018-02-10 RX ADMIN — IPRATROPIUM BROMIDE AND ALBUTEROL SULFATE SCH ML: 2.5; .5 SOLUTION RESPIRATORY (INHALATION) at 13:35

## 2018-02-10 RX ADMIN — VALACYCLOVIR HYDROCHLORIDE SCH MG: 500 TABLET, FILM COATED ORAL at 21:53

## 2018-02-10 RX ADMIN — OXYCODONE AND ACETAMINOPHEN PRN TAB: 5; 325 TABLET ORAL at 00:38

## 2018-02-10 RX ADMIN — ZOLPIDEM TARTRATE PRN MG: 5 TABLET ORAL at 22:01

## 2018-02-10 RX ADMIN — PROBIOTIC PRODUCT - TAB SCH MG: TAB at 18:12

## 2018-02-10 RX ADMIN — OXYCODONE AND ACETAMINOPHEN PRN TAB: 5; 325 TABLET ORAL at 06:14

## 2018-02-10 RX ADMIN — METHYLPREDNISOLONE SODIUM SUCCINATE SCH MG: 40 INJECTION, POWDER, FOR SOLUTION INTRAMUSCULAR; INTRAVENOUS at 21:53

## 2018-02-10 RX ADMIN — OXYCODONE AND ACETAMINOPHEN PRN TAB: 5; 325 TABLET ORAL at 12:16

## 2018-02-10 RX ADMIN — IPRATROPIUM BROMIDE AND ALBUTEROL SULFATE SCH ML: 2.5; .5 SOLUTION RESPIRATORY (INHALATION) at 07:49

## 2018-02-10 RX ADMIN — METHYLPREDNISOLONE SODIUM SUCCINATE SCH MG: 40 INJECTION, POWDER, FOR SOLUTION INTRAMUSCULAR; INTRAVENOUS at 13:21

## 2018-02-10 RX ADMIN — HEPARIN SODIUM SCH UNIT: 5000 INJECTION, SOLUTION INTRAVENOUS; SUBCUTANEOUS at 05:26

## 2018-02-10 RX ADMIN — HEPARIN SODIUM SCH UNIT: 5000 INJECTION, SOLUTION INTRAVENOUS; SUBCUTANEOUS at 18:24

## 2018-02-10 RX ADMIN — VALACYCLOVIR HYDROCHLORIDE SCH MG: 500 TABLET, FILM COATED ORAL at 10:10

## 2018-02-10 NOTE — PDOC PROGRESS REPORT
Subjective


Progress Note for:: 02/10/18


Subjective:: 





37-year-old female who presented to the emergency room on February 8 

complaining of shortness of breath cough chills and rib cage pain for the past 

2 months.  She is a smoker.  She has a history of


Cocaine use


Lupus


Sarcoidosis


Mental herpes


These have not been treated due to a lack of insurance.


In the emergency room she was found to have hypoxia with sats in the 80s on 

ambulation.





Feels somewhat better.  No complaints at present.


He does not have a pulmonologist or rheumatologist that she follows with.


Reason For Visit: 


ACUTE RESPIRATORY FAILURE








Physical Exam


Vital Signs: 


 











Temp Pulse Resp BP Pulse Ox


 


 97.5 F   71   17   118/81   100 


 


 02/10/18 08:34  02/10/18 08:34  02/10/18 08:34  02/10/18 08:34  02/10/18 08:34








 Intake & Output











 02/09/18 02/10/18 02/11/18





 06:59 06:59 06:59


 


Intake Total 200 860 


 


Balance 200 860 











Additional comments: 





 female sitting up in bed not in acute distress


Lungs: Clear to auscultation bilaterally decreased breath sounds bilateral 

bases no wheezing or crackles heard normal respiratory effort


Cardiac: S1-S2 regular no peripheral edema no cyanosis no calf tenderness


Skin: Warm and dry


Abdomen: Soft, obese, no focal tenderness, normal bowel sounds











Results


Laboratory Results: 


 





 02/09/18 06:53 





 02/09/18 06:53 








Impressions: 


 





Chest X-Ray  02/08/18 08:20


IMPRESSION:  No significant interval change


 








Chest/Abdomen CTA  02/08/18 08:30


IMPRESSION:  No evidence for pulmonary embolic disease.  Diffuse bilateral 

ground-glass opacities are identified with a more consolidative process being 

identified in the right mid lung field.  No pleural effusions are identified.  

Differential possibilities would include pulmonary edema on a cardiogenic or 

noncardiogenic basis.  In light of the patient's clinical history this could be 

a possible drug reaction.  This could also represent a hypersensitivity 

pneumonitis.  The possibility of an infectious process cannot be excluded.  

Clinical correlation is recommended.  Old granulomatous disease.  Other 

findings as noted above.


 














Assessment & Plan





- Diagnosis


(1) Acute respiratory failure


Qualifiers: 


   Respiratory failure complication: hypoxia   Qualified Code(s): J96.01 - 

Acute respiratory failure with hypoxia   


Is this a current diagnosis for this admission?: Yes   


Plan: 


Continue supplemental oxygen and steroids and antibiotics.


This is multifactorial due to pneumonia or lupus and prior history of sarcoid.


Pulmonology is following the patient.








(2) PNA (pneumonia)


Qualifiers: 


   Pneumonia type: due to unspecified organism   Laterality: unspecified 

laterality   Lung location: unspecified part of lung   Qualified Code(s): J18.9 

- Pneumonia, unspecified organism   


Is this a current diagnosis for this admission?: Yes   


Plan: 


See above








(3) Lupus


Qualifiers: 


   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 

lupus erythematosus   


Is this a current diagnosis for this admission?: Yes   


Plan: 


See above








(4) Sarcoidosis


Is this a current diagnosis for this admission?: Yes   


Plan: 


See above








(5) Genital herpes


Is this a current diagnosis for this admission?: Yes   


Plan: 


Valtrex








- Time


Time Spent with patient: 35 or more minutes

## 2018-02-11 RX ADMIN — IPRATROPIUM BROMIDE AND ALBUTEROL SULFATE SCH ML: 2.5; .5 SOLUTION RESPIRATORY (INHALATION) at 08:15

## 2018-02-11 RX ADMIN — HEPARIN SODIUM SCH UNIT: 5000 INJECTION, SOLUTION INTRAVENOUS; SUBCUTANEOUS at 09:57

## 2018-02-11 RX ADMIN — OXYCODONE AND ACETAMINOPHEN PRN TAB: 5; 325 TABLET ORAL at 08:43

## 2018-02-11 RX ADMIN — PROBIOTIC PRODUCT - TAB SCH MG: TAB at 17:22

## 2018-02-11 RX ADMIN — ZOLPIDEM TARTRATE PRN MG: 5 TABLET ORAL at 23:00

## 2018-02-11 RX ADMIN — LANSOPRAZOLE SCH MG: 30 TABLET, ORALLY DISINTEGRATING, DELAYED RELEASE ORAL at 17:21

## 2018-02-11 RX ADMIN — VALACYCLOVIR HYDROCHLORIDE SCH MG: 500 TABLET, FILM COATED ORAL at 21:49

## 2018-02-11 RX ADMIN — PROBIOTIC PRODUCT - TAB SCH MG: TAB at 09:59

## 2018-02-11 RX ADMIN — METHYLPREDNISOLONE SODIUM SUCCINATE SCH MG: 40 INJECTION, POWDER, FOR SOLUTION INTRAMUSCULAR; INTRAVENOUS at 05:55

## 2018-02-11 RX ADMIN — DOCUSATE SODIUM SCH MG: 100 CAPSULE, LIQUID FILLED ORAL at 09:59

## 2018-02-11 RX ADMIN — HEPARIN SODIUM SCH UNIT: 5000 INJECTION, SOLUTION INTRAVENOUS; SUBCUTANEOUS at 03:01

## 2018-02-11 RX ADMIN — OXYCODONE AND ACETAMINOPHEN PRN TAB: 5; 325 TABLET ORAL at 21:49

## 2018-02-11 RX ADMIN — PREDNISONE SCH MG: 20 TABLET ORAL at 17:21

## 2018-02-11 RX ADMIN — LEVOFLOXACIN SCH ML: 750 INJECTION, SOLUTION INTRAVENOUS at 13:06

## 2018-02-11 RX ADMIN — OXYCODONE AND ACETAMINOPHEN PRN TAB: 5; 325 TABLET ORAL at 15:15

## 2018-02-11 RX ADMIN — IPRATROPIUM BROMIDE AND ALBUTEROL SULFATE SCH ML: 2.5; .5 SOLUTION RESPIRATORY (INHALATION) at 19:19

## 2018-02-11 RX ADMIN — IPRATROPIUM BROMIDE AND ALBUTEROL SULFATE SCH ML: 2.5; .5 SOLUTION RESPIRATORY (INHALATION) at 14:16

## 2018-02-11 RX ADMIN — LANSOPRAZOLE SCH MG: 30 TABLET, ORALLY DISINTEGRATING, DELAYED RELEASE ORAL at 05:55

## 2018-02-11 RX ADMIN — VALACYCLOVIR HYDROCHLORIDE SCH MG: 500 TABLET, FILM COATED ORAL at 10:01

## 2018-02-11 NOTE — PDOC PROGRESS REPORT
Subjective


Progress Note for:: 02/11/18


Subjective:: 





37-year-old female who presented to the emergency room on February 8 

complaining of shortness of breath cough chills and rib cage pain for the past 

2 months.  She is a smoker.  She has a history of


Past medical history:


Cocaine use


Lupus


Sarcoidosis





In the emergency room she was found to have hypoxia with sats in the 80s on 

ambulation.





Feels somewhat better.  No complaints at present.


We are awaiting pulmonology consultation for assistance in the management of 

this complicated case.





She had moved to the area approximately a year ago and has not had any medical 

appointments.


Reason For Visit: 


ACUTE RESPIRATORY FAILURE








Physical Exam


Vital Signs: 


 











Temp Pulse Resp BP Pulse Ox


 


 97.3 F   61   18   138/96 H  100 


 


 02/11/18 04:11  02/11/18 04:11  02/11/18 04:00  02/11/18 04:11  02/11/18 04:11








 Intake & Output











 02/10/18 02/11/18 02/12/18





 06:59 06:59 06:59


 


Intake Total 860 1150 


 


Balance 860 1150 


 


Weight 93.2 kg 94.1 kg 











Additional comments: 





 female sitting up in bed not in acute distress


Lungs: Normal respiratory effort, scattered rhonchi, no wheezing


Cardiac: S1-S2 regular no peripheral edema no cyanosis no calf tenderness


Skin: Warm and dry


Abdomen: Soft, obese, no focal tenderness, normal bowel sounds








Results


Laboratory Results: 


 





 02/09/18 06:53 





 02/09/18 06:53 








Impressions: 


 





Chest X-Ray  02/08/18 08:20


IMPRESSION:  No significant interval change


 








Chest/Abdomen CTA  02/08/18 08:30


IMPRESSION:  No evidence for pulmonary embolic disease.  Diffuse bilateral 

ground-glass opacities are identified with a more consolidative process being 

identified in the right mid lung field.  No pleural effusions are identified.  

Differential possibilities would include pulmonary edema on a cardiogenic or 

noncardiogenic basis.  In light of the patient's clinical history this could be 

a possible drug reaction.  This could also represent a hypersensitivity 

pneumonitis.  The possibility of an infectious process cannot be excluded.  

Clinical correlation is recommended.  Old granulomatous disease.  Other 

findings as noted above.


 














Assessment & Plan





- Diagnosis


(1) Acute respiratory failure


Qualifiers: 


   Respiratory failure complication: hypoxia   Qualified Code(s): J96.01 - 

Acute respiratory failure with hypoxia   


Is this a current diagnosis for this admission?: Yes   


Plan: 


Continue supplemental oxygen and steroids and antibiotics.


This is multifactorial due to pneumonia or lupus and prior history of sarcoid.


Awaiting pulmonology evaluation.








(2) PNA (pneumonia)


Qualifiers: 


   Pneumonia type: due to unspecified organism   Laterality: unspecified 

laterality   Lung location: unspecified part of lung   Qualified Code(s): J18.9 

- Pneumonia, unspecified organism   


Is this a current diagnosis for this admission?: Yes   


Plan: 


See above








(3) Lupus


Qualifiers: 


   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 

lupus erythematosus   


Is this a current diagnosis for this admission?: Yes   


Plan: 


See above








(4) Sarcoidosis


Is this a current diagnosis for this admission?: Yes   


Plan: 


See above








(5) Genital herpes


Is this a current diagnosis for this admission?: Yes   


Plan: 


Valtrex








- Time


Time Spent with patient: 25-34 minutes

## 2018-02-12 LAB
ALBUMIN SERPL-MCNC: 4.1 G/DL (ref 3.5–5)
ALP SERPL-CCNC: 101 U/L (ref 38–126)
ALT SERPL-CCNC: 41 U/L (ref 9–52)
ANION GAP SERPL CALC-SCNC: 11 MMOL/L (ref 5–19)
AST SERPL-CCNC: 49 U/L (ref 14–36)
BILIRUB DIRECT SERPL-MCNC: 0.3 MG/DL (ref 0–0.4)
BILIRUB SERPL-MCNC: 0.3 MG/DL (ref 0.2–1.3)
BUN SERPL-MCNC: 13 MG/DL (ref 7–20)
CALCIUM: 9.4 MG/DL (ref 8.4–10.2)
CHLORIDE SERPL-SCNC: 105 MMOL/L (ref 98–107)
CO2 SERPL-SCNC: 27 MMOL/L (ref 22–30)
ERYTHROCYTE [DISTWIDTH] IN BLOOD BY AUTOMATED COUNT: 13.5 % (ref 11.5–14)
GLUCOSE SERPL-MCNC: 160 MG/DL (ref 75–110)
HCT VFR BLD CALC: 39.9 % (ref 36–47)
HGB BLD-MCNC: 13.5 G/DL (ref 12–15.5)
MAGNESIUM SERPL-MCNC: 2 MG/DL (ref 1.6–2.3)
MCH RBC QN AUTO: 30.2 PG (ref 27–33.4)
MCHC RBC AUTO-ENTMCNC: 33.7 G/DL (ref 32–36)
MCV RBC AUTO: 90 FL (ref 80–97)
PLATELET # BLD: 327 10^3/UL (ref 150–450)
POTASSIUM SERPL-SCNC: 3.9 MMOL/L (ref 3.6–5)
PROT SERPL-MCNC: 6.9 G/DL (ref 6.3–8.2)
RBC # BLD AUTO: 4.46 10^6/UL (ref 3.72–5.28)
SODIUM SERPL-SCNC: 143.3 MMOL/L (ref 137–145)
WBC # BLD AUTO: 17.2 10^3/UL (ref 4–10.5)

## 2018-02-12 RX ADMIN — VALACYCLOVIR HYDROCHLORIDE SCH MG: 500 TABLET, FILM COATED ORAL at 09:44

## 2018-02-12 RX ADMIN — LANSOPRAZOLE SCH MG: 30 TABLET, ORALLY DISINTEGRATING, DELAYED RELEASE ORAL at 05:58

## 2018-02-12 RX ADMIN — LEVOFLOXACIN SCH MG: 750 TABLET, FILM COATED ORAL at 09:44

## 2018-02-12 RX ADMIN — VALACYCLOVIR HYDROCHLORIDE SCH MG: 500 TABLET, FILM COATED ORAL at 21:19

## 2018-02-12 RX ADMIN — PROBIOTIC PRODUCT - TAB SCH MG: TAB at 09:43

## 2018-02-12 RX ADMIN — IPRATROPIUM BROMIDE AND ALBUTEROL SULFATE SCH ML: 2.5; .5 SOLUTION RESPIRATORY (INHALATION) at 13:58

## 2018-02-12 RX ADMIN — LANSOPRAZOLE SCH MG: 30 TABLET, ORALLY DISINTEGRATING, DELAYED RELEASE ORAL at 18:55

## 2018-02-12 RX ADMIN — IPRATROPIUM BROMIDE AND ALBUTEROL SULFATE SCH ML: 2.5; .5 SOLUTION RESPIRATORY (INHALATION) at 08:27

## 2018-02-12 RX ADMIN — DOCUSATE SODIUM SCH MG: 100 CAPSULE, LIQUID FILLED ORAL at 09:44

## 2018-02-12 RX ADMIN — OXYCODONE AND ACETAMINOPHEN PRN TAB: 5; 325 TABLET ORAL at 19:01

## 2018-02-12 RX ADMIN — PROBIOTIC PRODUCT - TAB SCH MG: TAB at 18:56

## 2018-02-12 RX ADMIN — PREDNISONE SCH MG: 20 TABLET ORAL at 18:56

## 2018-02-12 RX ADMIN — IPRATROPIUM BROMIDE AND ALBUTEROL SULFATE SCH ML: 2.5; .5 SOLUTION RESPIRATORY (INHALATION) at 19:45

## 2018-02-12 RX ADMIN — OXYCODONE AND ACETAMINOPHEN PRN TAB: 5; 325 TABLET ORAL at 04:18

## 2018-02-12 RX ADMIN — OXYCODONE AND ACETAMINOPHEN PRN TAB: 5; 325 TABLET ORAL at 10:53

## 2018-02-13 RX ADMIN — CEFEPIME SCH GM: 2 INJECTION, POWDER, FOR SOLUTION INTRAMUSCULAR; INTRAVENOUS at 22:53

## 2018-02-13 RX ADMIN — IPRATROPIUM BROMIDE AND ALBUTEROL SULFATE SCH ML: 2.5; .5 SOLUTION RESPIRATORY (INHALATION) at 20:27

## 2018-02-13 RX ADMIN — LANSOPRAZOLE SCH MG: 30 TABLET, ORALLY DISINTEGRATING, DELAYED RELEASE ORAL at 05:26

## 2018-02-13 RX ADMIN — VANCOMYCIN HYDROCHLORIDE SCH MG: 1 INJECTION, POWDER, LYOPHILIZED, FOR SOLUTION INTRAVENOUS at 23:55

## 2018-02-13 RX ADMIN — PROBIOTIC PRODUCT - TAB SCH MG: TAB at 09:19

## 2018-02-13 RX ADMIN — IPRATROPIUM BROMIDE AND ALBUTEROL SULFATE SCH ML: 2.5; .5 SOLUTION RESPIRATORY (INHALATION) at 14:47

## 2018-02-13 RX ADMIN — LEVOFLOXACIN SCH MG: 750 TABLET, FILM COATED ORAL at 09:20

## 2018-02-13 RX ADMIN — OXYCODONE AND ACETAMINOPHEN PRN TAB: 5; 325 TABLET ORAL at 01:11

## 2018-02-13 RX ADMIN — DOCUSATE SODIUM SCH MG: 100 CAPSULE, LIQUID FILLED ORAL at 09:20

## 2018-02-13 RX ADMIN — VALACYCLOVIR HYDROCHLORIDE SCH MG: 500 TABLET, FILM COATED ORAL at 09:19

## 2018-02-13 RX ADMIN — ZOLPIDEM TARTRATE PRN MG: 5 TABLET ORAL at 22:01

## 2018-02-13 RX ADMIN — OXYCODONE AND ACETAMINOPHEN PRN TAB: 5; 325 TABLET ORAL at 22:02

## 2018-02-13 RX ADMIN — PROBIOTIC PRODUCT - TAB SCH MG: TAB at 17:15

## 2018-02-13 RX ADMIN — LANSOPRAZOLE SCH MG: 30 TABLET, ORALLY DISINTEGRATING, DELAYED RELEASE ORAL at 17:15

## 2018-02-13 RX ADMIN — ZOLPIDEM TARTRATE PRN MG: 5 TABLET ORAL at 01:11

## 2018-02-13 RX ADMIN — OXYCODONE AND ACETAMINOPHEN PRN TAB: 5; 325 TABLET ORAL at 11:50

## 2018-02-13 RX ADMIN — IPRATROPIUM BROMIDE AND ALBUTEROL SULFATE SCH ML: 2.5; .5 SOLUTION RESPIRATORY (INHALATION) at 08:47

## 2018-02-13 NOTE — PDOC PROGRESS REPORT
Subjective


Progress Note for:: 02/13/18


Subjective:: 





Patient is still extremely short of breath


No fever no chills her white count has decreased to 17,000


She still hypoxemic on room air


No pleuritic chest pain


Reason For Visit: 


ACUTE RESPIRATORY FAILURE








Physical Exam


Vital Signs: 


 











Temp Pulse Resp BP Pulse Ox


 


 97.5 F   98   18   131/85 H  99 


 


 02/13/18 16:50  02/13/18 16:50  02/13/18 16:50  02/13/18 16:50  02/13/18 16:50








 Intake & Output











 02/12/18 02/13/18 02/14/18





 00:59 00:59 00:59


 


Intake Total 6950 9290 


 


Balance 6950 9290 


 


Weight 94.1 kg 114 kg 114.1 kg








General appearance: PRESENT: Looks ill in no respiratory distress 


Head exam: PRESENT: atraumatic, normocephalic


Eye exam: PRESENT: conjunctival injection


Respiratory exam: PRESENT: clear to auscultation kurtis.  ABSENT: rales, rhonchi, 

wheezes


Cardiovascular exam: PRESENT: RRR.  ABSENT: diastolic murmur, rubs, systolic 

murmur


Pulses: PRESENT: normal dorsalis pedis pul


GI/Abdominal exam: PRESENT: normal bowel sounds, soft.  ABSENT: distended, 

guarding, mass, organolmegaly, rebound, tendernes


Neuro 2 through 12 intact no sensory nor motor deficits





Results


Laboratory Results: 


 





 02/12/18 07:02 





 02/12/18 07:02 








Impressions: 


 





Chest X-Ray  02/08/18 08:20


IMPRESSION:  No significant interval change


 








Chest/Abdomen CTA  02/08/18 08:30


IMPRESSION:  No evidence for pulmonary embolic disease.  Diffuse bilateral 

ground-glass opacities are identified with a more consolidative process being 

identified in the right mid lung field.  No pleural effusions are identified.  

Differential possibilities would include pulmonary edema on a cardiogenic or 

noncardiogenic basis.  In light of the patient's clinical history this could be 

a possible drug reaction.  This could also represent a hypersensitivity 

pneumonitis.  The possibility of an infectious process cannot be excluded.  

Clinical correlation is recommended.  Old granulomatous disease.  Other 

findings as noted above.


 














Assessment & Plan





- Diagnosis








(2) Lupus


Qualifiers: 


   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 

lupus erythematosus   


Is this a current diagnosis for this admission?: Yes   





(3) PNA (pneumonia)


Qualifiers: 


   Pneumonia type: due to unspecified organism   Laterality: unspecified 

laterality   Lung location: unspecified part of lung   Qualified Code(s): J18.9 

- Pneumonia, unspecified organism   


Is this a current diagnosis for this admission?: Yes   





(4) Personal history of sarcoidosis


Is this a current diagnosis for this admission?: Yes   





- Time


Time Spent with patient: 





Patient has now been treated with conventional treatment with Levaquin for 2 3 

days without improvement


We will broaden spectrum of antibiotics at this time and treat the patient as 

hospital-acquired pneumonia


We will add cefepime and vancomycin


Repeat chest x-ray in the morning


Repeat labs


We will decrease steroids


Time Spent with patient: 25-34 minutes

## 2018-02-14 LAB
ABSOLUTE LYMPHOCYTES# (MANUAL): 2.4 10^3/UL (ref 0.5–4.7)
ABSOLUTE MONOCYTES # (MANUAL): 1.5 10^3/UL (ref 0.1–1.4)
ABSOLUTE NEUTROPHILS# (MANUAL): 20.4 10^3/UL (ref 1.7–8.2)
ADD MANUAL DIFF: YES
ANION GAP SERPL CALC-SCNC: 13 MMOL/L (ref 5–19)
BASOPHILS NFR BLD MANUAL: 0 % (ref 0–2)
BUN SERPL-MCNC: 13 MG/DL (ref 7–20)
CALCIUM: 9.9 MG/DL (ref 8.4–10.2)
CHLORIDE SERPL-SCNC: 105 MMOL/L (ref 98–107)
CO2 SERPL-SCNC: 23 MMOL/L (ref 22–30)
EOSINOPHIL NFR BLD MANUAL: 0 % (ref 0–6)
ERYTHROCYTE [DISTWIDTH] IN BLOOD BY AUTOMATED COUNT: 13.8 % (ref 11.5–14)
GLUCOSE SERPL-MCNC: 161 MG/DL (ref 75–110)
HCT VFR BLD CALC: 44.4 % (ref 36–47)
HGB BLD-MCNC: 15.1 G/DL (ref 12–15.5)
MCH RBC QN AUTO: 30.4 PG (ref 27–33.4)
MCHC RBC AUTO-ENTMCNC: 34.2 G/DL (ref 32–36)
MCV RBC AUTO: 89 FL (ref 80–97)
METAMYELOCYTES % (MANUAL): 2 %
MONOCYTES % (MANUAL): 6 % (ref 3–13)
NEUTS BAND NFR BLD MANUAL: 3 % (ref 3–5)
PLATELET # BLD: 375 10^3/UL (ref 150–450)
PLATELET COMMENT: ADEQUATE
POLYCHROMASIA BLD QL SMEAR: (no result)
POTASSIUM SERPL-SCNC: 4.8 MMOL/L (ref 3.6–5)
RBC # BLD AUTO: 4.98 10^6/UL (ref 3.72–5.28)
SEGMENTED NEUTROPHILS % (MAN): 79 % (ref 42–78)
SODIUM SERPL-SCNC: 141.3 MMOL/L (ref 137–145)
TOTAL CELLS COUNTED BLD: 100
VARIANT LYMPHS NFR BLD MANUAL: 8 % (ref 13–45)
WBC # BLD AUTO: 24.3 10^3/UL (ref 4–10.5)

## 2018-02-14 RX ADMIN — OXYCODONE AND ACETAMINOPHEN PRN TAB: 5; 325 TABLET ORAL at 22:59

## 2018-02-14 RX ADMIN — IPRATROPIUM BROMIDE AND ALBUTEROL SULFATE SCH ML: 2.5; .5 SOLUTION RESPIRATORY (INHALATION) at 20:02

## 2018-02-14 RX ADMIN — CEFEPIME SCH GM: 2 INJECTION, POWDER, FOR SOLUTION INTRAMUSCULAR; INTRAVENOUS at 09:48

## 2018-02-14 RX ADMIN — LANSOPRAZOLE SCH MG: 30 TABLET, ORALLY DISINTEGRATING, DELAYED RELEASE ORAL at 16:28

## 2018-02-14 RX ADMIN — LANSOPRAZOLE SCH MG: 30 TABLET, ORALLY DISINTEGRATING, DELAYED RELEASE ORAL at 05:12

## 2018-02-14 RX ADMIN — OXYCODONE AND ACETAMINOPHEN PRN TAB: 5; 325 TABLET ORAL at 06:44

## 2018-02-14 RX ADMIN — IPRATROPIUM BROMIDE AND ALBUTEROL SULFATE SCH ML: 2.5; .5 SOLUTION RESPIRATORY (INHALATION) at 13:35

## 2018-02-14 RX ADMIN — VANCOMYCIN HYDROCHLORIDE SCH MG: 1 INJECTION, POWDER, LYOPHILIZED, FOR SOLUTION INTRAVENOUS at 06:37

## 2018-02-14 RX ADMIN — IPRATROPIUM BROMIDE AND ALBUTEROL SULFATE SCH ML: 2.5; .5 SOLUTION RESPIRATORY (INHALATION) at 07:53

## 2018-02-14 RX ADMIN — OXYCODONE AND ACETAMINOPHEN PRN TAB: 5; 325 TABLET ORAL at 16:27

## 2018-02-14 RX ADMIN — DOCUSATE SODIUM SCH MG: 100 CAPSULE, LIQUID FILLED ORAL at 09:48

## 2018-02-14 RX ADMIN — PROBIOTIC PRODUCT - TAB SCH MG: TAB at 09:48

## 2018-02-14 RX ADMIN — PROBIOTIC PRODUCT - TAB SCH MG: TAB at 18:29

## 2018-02-14 RX ADMIN — LEVOFLOXACIN SCH MG: 750 TABLET, FILM COATED ORAL at 09:48

## 2018-02-14 NOTE — RADIOLOGY REPORT (SQ)
EXAM DESCRIPTION:  CHEST SINGLE VIEW



COMPLETED DATE/TIME:  2/14/2018 9:41 am



REASON FOR STUDY:  SOB



COMPARISON:  2/8/2018



NUMBER OF VIEWS:  One view.



TECHNIQUE:  Single frontal radiographic view of the chest acquired.



LIMITATIONS:  None.



FINDINGS:  LUNGS AND PLEURA: Unchanged right lower lobe pulmonary nodule.  Diffuse ground-glass atten
uation.

MEDIASTINUM AND HILAR STRUCTURES: No masses.  Contour normal.

HEART AND VASCULAR STRUCTURES: Heart enlarged without failure.  Normal vasculature.

BONES: No acute findings.

HARDWARE: None in the chest.

OTHER: No other significant finding.



IMPRESSION:  Chronic interstitial changes.  Stable right lower lobe pulmonary nodule.



TECHNICAL DOCUMENTATION:  JOB ID:  [id number]

 2011 [employer]- All Rights Reserved

## 2018-02-14 NOTE — PDOC PROGRESS REPORT
Subjective


Progress Note for:: 02/14/18


Subjective:: 


 Patient is seen on rounds. She is up ambulating around room off her oxygen. 

She appears in no acute distress. She denies any cough or dyspnea. Chest xray 

shows chronic interstitial changes at the right base. No significant 

infiltrates. She denies any nausea, vomiting or abdominal pain. She denies any 

diarrhea. She denies any fevers or chills overnight. She denies any significant 

arthralgias or myalgias.  Remaining review of systems are negative


Reason For Visit: 


ACUTE RESPIRATORY FAILURE








Physical Exam


Vital Signs: 


 











Temp Pulse Resp BP Pulse Ox


 


 97.4 F   120 H  16   126/76 H  97 


 


 02/14/18 05:07  02/14/18 13:34  02/14/18 13:34  02/14/18 05:07  02/14/18 13:34








 Intake & Output











 02/13/18 02/14/18 02/15/18





 06:59 06:59 06:59


 


Intake Total 4800 2000 


 


Balance 4800 2000 


 


Weight 114.1 kg 114.9 kg 114.9 kg











General appearance: PRESENT: no acute distress, morbidly obese, well-developed, 

well-nourished


Head exam: PRESENT: atraumatic, normocephalic


Eye exam: PRESENT: conjunctiva pink, EOMI, PERRLA.  ABSENT: scleral icterus


Ear exam: PRESENT: normal external ear exam


Mouth exam: PRESENT: moist, tongue midline


Neck exam: ABSENT: carotid bruit, JVD, lymphadenopathy, thyromegaly


Respiratory exam: PRESENT: crackles, symmetrical, unlabored


Cardiovascular exam: PRESENT: RRR.  ABSENT: diastolic murmur, rubs, systolic 

murmur


Pulses: PRESENT: normal carotid pulses


Vascular exam: PRESENT: normal capillary refill


GI/Abdominal exam: PRESENT: normal bowel sounds, soft.  ABSENT: distended, 

guarding, mass, organolmegaly, rebound, tenderness


Rectal exam: PRESENT: deferred


Extremities exam: PRESENT: full ROM.  ABSENT: calf tenderness, clubbing, pedal 

edema


Musculoskeletal exam: PRESENT: ambulatory


Neurological exam: PRESENT: alert, awake, oriented to person, oriented to place

, oriented to time, oriented to situation, CN II-XII grossly intact.  ABSENT: 

motor sensory deficit


Skin exam: PRESENT: dry, intact, warm.  ABSENT: cyanosis, rash





Results


Laboratory Results: 


 





 02/14/18 07:12 





 02/14/18 07:12 





 











  02/14/18 02/14/18





  07:12 07:12


 


WBC  24.3 H 


 


RBC  4.98 


 


Hgb  15.1 


 


Hct  44.4 


 


MCV  89 


 


MCH  30.4 


 


MCHC  34.2 


 


RDW  13.8 


 


Plt Count  375 


 


Seg Neutrophils %  Not Reportable 


 


Lymphocytes %  Not Reportable 


 


Monocytes %  Not Reportable 


 


Eosinophils %  Not Reportable 


 


Basophils %  Not Reportable 


 


Absolute Neutrophils  Not Reportable 


 


Absolute Lymphocytes  Not Reportable 


 


Absolute Monocytes  Not Reportable 


 


Absolute Eosinophils  Not Reportable 


 


Absolute Basophils  Not Reportable 


 


Sodium   141.3


 


Potassium   4.8


 


Chloride   105


 


Carbon Dioxide   23


 


Anion Gap   13


 


BUN   13


 


Creatinine   0.54


 


Est GFR ( Amer)   > 60


 


Est GFR (Non-Af Amer)   > 60


 


Glucose   161 H


 


Calcium   9.9











Impressions: 


 





Chest/Abdomen CTA  02/08/18 08:30


IMPRESSION:  No evidence for pulmonary embolic disease.  Diffuse bilateral 

ground-glass opacities are identified with a more consolidative process being 

identified in the right mid lung field.  No pleural effusions are identified.  

Differential possibilities would include pulmonary edema on a cardiogenic or 

noncardiogenic basis.  In light of the patient's clinical history this could be 

a possible drug reaction.  This could also represent a hypersensitivity 

pneumonitis.  The possibility of an infectious process cannot be excluded.  

Clinical correlation is recommended.  Old granulomatous disease.  Other 

findings as noted above.


 








Chest X-Ray  02/14/18 08:00


IMPRESSION:  Chronic interstitial changes.  Stable right lower lobe pulmonary 

nodule.


 














Assessment & Plan





- Diagnosis


(1) Acute respiratory failure


Qualifiers: 


   Respiratory failure complication: hypoxia   Qualified Code(s): J96.01 - 

Acute respiratory failure with hypoxia   


Is this a current diagnosis for this admission?: Yes   


Plan: 


Patient is still requiring 1 liter of oxygen. There is no new changes on her 

chest xray, certainly no hospital acquired pneumonia. She has no cough or 

fever. Will descalate antibiotics. Add incentive spirometer and flutter valve. 

OOB and ambulate. 











(3) PNA (pneumonia)


Qualifiers: 


   Pneumonia type: due to unspecified organism   Laterality: right   Lung 

location: lower lobe of lung   Qualified Code(s): J18.1 - Lobar pneumonia, 

unspecified organism   


Is this a current diagnosis for this admission?: Yes   


Plan: 


Blood cultures are negative. No sputum. Will descalate therapy .Prepare for 

discharge








(4) Sarcoidosis


Is this a current diagnosis for this admission?: Yes   


Plan: 


Patient has no definite diagnosis of sarcoidosis with tissue biopsy. Will need 

continued follow up as an outpatient








- Time


Time Spent with patient: 25-34 minutes


Medications reviewed and adjusted accordingly: Yes


Anticipated discharge: Home


Within: within 48 hours

## 2018-02-15 VITALS — DIASTOLIC BLOOD PRESSURE: 85 MMHG | SYSTOLIC BLOOD PRESSURE: 131 MMHG

## 2018-02-15 PROCEDURE — 3E0234Z INTRODUCTION OF SERUM, TOXOID AND VACCINE INTO MUSCLE, PERCUTANEOUS APPROACH: ICD-10-PCS | Performed by: FAMILY MEDICINE

## 2018-02-15 RX ADMIN — OXYCODONE AND ACETAMINOPHEN PRN TAB: 5; 325 TABLET ORAL at 09:20

## 2018-02-15 RX ADMIN — LANSOPRAZOLE SCH MG: 30 TABLET, ORALLY DISINTEGRATING, DELAYED RELEASE ORAL at 06:09

## 2018-02-15 RX ADMIN — IPRATROPIUM BROMIDE AND ALBUTEROL SULFATE SCH ML: 2.5; .5 SOLUTION RESPIRATORY (INHALATION) at 14:11

## 2018-02-15 RX ADMIN — ZOLPIDEM TARTRATE PRN MG: 5 TABLET ORAL at 01:10

## 2018-02-15 RX ADMIN — PROBIOTIC PRODUCT - TAB SCH MG: TAB at 09:19

## 2018-02-15 RX ADMIN — IPRATROPIUM BROMIDE AND ALBUTEROL SULFATE SCH ML: 2.5; .5 SOLUTION RESPIRATORY (INHALATION) at 08:41

## 2018-02-15 RX ADMIN — DOCUSATE SODIUM SCH MG: 100 CAPSULE, LIQUID FILLED ORAL at 09:20

## 2018-02-15 NOTE — PDOC DISCHARGE SUMMARY
General





- Admit/Disc Date/PCP


Admission Date/Primary Care Provider: 


  02/08/18 15:30





  


She has no physician.  We are setting her up at the [facility] at 

discharge


Discharge Date: 02/15/18





- Discharge Diagnosis


(1) Acute respiratory failure


Is this a current diagnosis for this admission?: Yes   


Summary: 


The patient had acute hypoxic respiratory failure likely secondary to an 

underlying pneumonia.  Patient also has sarcoidosis.  She would benefit from 

pulmonology referral as an outpatient.  She has been weaned off of her oxygen 

on the day of discharge.








(2) PNA (pneumonia)


Is this a current diagnosis for this admission?: Yes   


Summary: 


Her CT scan of her chest could not rule out an underlying pneumonia.  She has 

significant groundglass opacities noted throughout both lung fields.  There was 

a possible consolidation.  She will complete a course of p.o. Levaquin as an 

outpatient.  She will follow-up at the [facility].  Concerns were 

for gram positives and atypicals.








(3) Sarcoidosis


Is this a current diagnosis for this admission?: Yes   


Summary: 


She will need follow-up as an outpatient.  Overall she is stable.  She has no 

insurance at this point.  We are going to set her up with the [facility] at discharge and hopefully they can refer her to pulmonology as an 

outpatient.








- Additional Information


Resuscitation Status: Full Code


Discharge Diet: Regular


Discharge Activity: Activity As Tolerated, Balance Activity w/Rest, Slowly 

Increase Activity


Prescriptions: 


Prednisone [Deltasone 20 mg Tablet] 10 mg PO QHS #21 tablet


Levofloxacin [Levaquin 750 mg Tablet] 750 mg PO DAILY #7 tab


Home Medications: 








Levothyroxine Sodium [Synthroid] 125 mcg PO Q6AM 02/08/18 


Levofloxacin [Levaquin 750 mg Tablet] 750 mg PO DAILY #7 tab 02/15/18 


Prednisone [Deltasone 20 mg Tablet] 10 mg PO QHS #21 tablet 02/15/18 











History of Present Illness


History of Present Illness: 


LUCIUS LILLY is a 38 year old female who presented to the emergency room 

with shortness of breath.








Hospital Course


Hospital Course: 





The patient is a 37-year-old  female who presented to the emergency 

room with shortness of breath and cough.  Her cough had been present and 

worsening for the past 2 months.  She had been cutting down smoking to 4 

cigarettes a day.  The patient does have a history of sarcoidosis but is not 

being treated due to lack of insurance.  In the emergency room she was found to 

be hypoxic.  She had a CT angiography of the chest and there were concerns for 

an underlying pneumonia.  She was admitted to the hospital and started on IV 

antibiotics.  She was quite slow to improve.  Over the course of the 

hospitalization she did get back to her baseline.  On the day of discharge she 

has been totally weaned off of her oxygen.  It is felt that she can safely be 

discharged home.  We are going to try to set her up at the [facility] at discharge.  At this point maximum hospital benefit has been reached.  

The patient will be discharged home today in stable condition





Physical Exam


Vital Signs: 


 











Temp Pulse Resp BP Pulse Ox


 


 97.8 F   82   16   127/82 H  100 


 


 02/15/18 09:06  02/15/18 09:06  02/15/18 09:06  02/15/18 09:06  02/15/18 09:06








 Intake & Output











 02/14/18 02/15/18 02/16/18





 06:59 06:59 06:59


 


Intake Total 2000 1000 


 


Balance 2000 1000 


 


Weight 114.9 kg 115 kg 











General appearance: PRESENT: no acute distress, well-developed, well-nourished


Head exam: PRESENT: atraumatic, normocephalic


Eye exam: PRESENT: conjunctiva pink, EOMI, PERRLA.  ABSENT: scleral icterus


Mouth exam: PRESENT: moist, tongue midline


Respiratory exam: PRESENT: clear to auscultation kurtis.  ABSENT: rales, rhonchi, 

wheezes


Cardiovascular exam: PRESENT: RRR.  ABSENT: diastolic murmur, rubs, systolic 

murmur


GI/Abdominal exam: PRESENT: normal bowel sounds, soft.  ABSENT: distended, 

guarding, mass, organolmegaly, rebound, tenderness


Rectal exam: PRESENT: deferred


Extremities exam: PRESENT: full ROM.  ABSENT: calf tenderness, clubbing, pedal 

edema


Musculoskeletal exam: PRESENT: ambulatory


Neurological exam: PRESENT: alert, awake, oriented to person, oriented to place

, oriented to time, oriented to situation, CN II-XII grossly intact.  ABSENT: 

motor sensory deficit


Psychiatric exam: PRESENT: appropriate affect, normal mood.  ABSENT: homicidal 

ideation, suicidal ideation


Skin exam: PRESENT: dry, intact, warm.  ABSENT: cyanosis, rash





Results


Laboratory Results: 


 





 02/14/18 07:12 





 02/14/18 07:12 








Impressions: 


 





Chest/Abdomen CTA  02/08/18 08:30


IMPRESSION:  No evidence for pulmonary embolic disease.  Diffuse bilateral 

ground-glass opacities are identified with a more consolidative process being 

identified in the right mid lung field.  No pleural effusions are identified.  

Differential possibilities would include pulmonary edema on a cardiogenic or 

noncardiogenic basis.  In light of the patient's clinical history this could be 

a possible drug reaction.  This could also represent a hypersensitivity 

pneumonitis.  The possibility of an infectious process cannot be excluded.  

Clinical correlation is recommended.  Old granulomatous disease.  Other 

findings as noted above.


 








Chest X-Ray  02/14/18 08:00


IMPRESSION:  Chronic interstitial changes.  Stable right lower lobe pulmonary 

nodule.


 














Qualifiers


**PATEINT BEING DISCHARGED WITH ANY OF THE FOLLOWING DIAGNOSIS?: No





Plan


Discharge Plan: 





She will be discharged home today in stable condition


Time Spent: Greater than 30 Minutes

## 2018-03-11 ENCOUNTER — HOSPITAL ENCOUNTER (EMERGENCY)
Dept: HOSPITAL 62 - ER | Age: 38
LOS: 1 days | Discharge: HOME | End: 2018-03-12
Payer: SELF-PAY

## 2018-03-11 DIAGNOSIS — R07.81: ICD-10-CM

## 2018-03-11 DIAGNOSIS — G89.29: Primary | ICD-10-CM

## 2018-03-11 DIAGNOSIS — E03.9: ICD-10-CM

## 2018-03-11 DIAGNOSIS — M79.606: ICD-10-CM

## 2018-03-11 DIAGNOSIS — M25.561: ICD-10-CM

## 2018-03-11 DIAGNOSIS — M25.562: ICD-10-CM

## 2018-03-11 DIAGNOSIS — R07.9: ICD-10-CM

## 2018-03-11 DIAGNOSIS — Z98.51: ICD-10-CM

## 2018-03-11 PROCEDURE — 93005 ELECTROCARDIOGRAM TRACING: CPT

## 2018-03-11 PROCEDURE — 82550 ASSAY OF CK (CPK): CPT

## 2018-03-11 PROCEDURE — 82553 CREATINE MB FRACTION: CPT

## 2018-03-11 PROCEDURE — 71275 CT ANGIOGRAPHY CHEST: CPT

## 2018-03-11 PROCEDURE — 80053 COMPREHEN METABOLIC PANEL: CPT

## 2018-03-11 PROCEDURE — 84484 ASSAY OF TROPONIN QUANT: CPT

## 2018-03-11 PROCEDURE — 36415 COLL VENOUS BLD VENIPUNCTURE: CPT

## 2018-03-11 PROCEDURE — 85025 COMPLETE CBC W/AUTO DIFF WBC: CPT

## 2018-03-11 PROCEDURE — 93010 ELECTROCARDIOGRAM REPORT: CPT

## 2018-03-11 PROCEDURE — 99285 EMERGENCY DEPT VISIT HI MDM: CPT

## 2018-03-11 NOTE — EKG REPORT
SEVERITY:- ABNORMAL ECG -

SINUS RHYTHM

PRACHI, CONSIDER BIATRIAL ABNORMALITIES

LEFT VENTRICULAR HYPERTROPHY

:

Confirmed by: Nigel Roth 11-Mar-2018 23:53:47

## 2018-03-12 VITALS — DIASTOLIC BLOOD PRESSURE: 69 MMHG | SYSTOLIC BLOOD PRESSURE: 113 MMHG

## 2018-03-12 LAB
ABSOLUTE LYMPHOCYTES# (MANUAL): 4.3 10^3/UL (ref 0.5–4.7)
ABSOLUTE MONOCYTES # (MANUAL): 2.1 10^3/UL (ref 0.1–1.4)
ABSOLUTE NEUTROPHILS# (MANUAL): 20.3 10^3/UL (ref 1.7–8.2)
ADD MANUAL DIFF: YES
ALBUMIN SERPL-MCNC: 4.6 G/DL (ref 3.5–5)
ALP SERPL-CCNC: 107 U/L (ref 38–126)
ALT SERPL-CCNC: 68 U/L (ref 9–52)
ANION GAP SERPL CALC-SCNC: 12 MMOL/L (ref 5–19)
ANISOCYTOSIS BLD QL SMEAR: SLIGHT
AST SERPL-CCNC: 36 U/L (ref 14–36)
BASOPHILS NFR BLD MANUAL: 0 % (ref 0–2)
BILIRUB DIRECT SERPL-MCNC: 0.3 MG/DL (ref 0–0.4)
BILIRUB SERPL-MCNC: 0.4 MG/DL (ref 0.2–1.3)
BUN SERPL-MCNC: 16 MG/DL (ref 7–20)
CALCIUM: 10.1 MG/DL (ref 8.4–10.2)
CHLORIDE SERPL-SCNC: 93 MMOL/L (ref 98–107)
CK MB SERPL-MCNC: 0.41 NG/ML (ref ?–4.55)
CK SERPL-CCNC: < 20 U/L (ref 30–135)
CO2 SERPL-SCNC: 31 MMOL/L (ref 22–30)
EOSINOPHIL NFR BLD MANUAL: 0 % (ref 0–6)
ERYTHROCYTE [DISTWIDTH] IN BLOOD BY AUTOMATED COUNT: 14.1 % (ref 11.5–14)
GLUCOSE SERPL-MCNC: 86 MG/DL (ref 75–110)
HCT VFR BLD CALC: 46.6 % (ref 36–47)
HGB BLD-MCNC: 15.8 G/DL (ref 12–15.5)
MCH RBC QN AUTO: 30.1 PG (ref 27–33.4)
MCHC RBC AUTO-ENTMCNC: 33.9 G/DL (ref 32–36)
MCV RBC AUTO: 89 FL (ref 80–97)
MONOCYTES % (MANUAL): 8 % (ref 3–13)
PLATELET # BLD: 472 10^3/UL (ref 150–450)
PLATELET COMMENT: (no result)
POIKILOCYTOSIS BLD QL SMEAR: (no result)
POTASSIUM SERPL-SCNC: 4.6 MMOL/L (ref 3.6–5)
PROT SERPL-MCNC: 7.1 G/DL (ref 6.3–8.2)
RBC # BLD AUTO: 5.26 10^6/UL (ref 3.72–5.28)
SEGMENTED NEUTROPHILS % (MAN): 76 % (ref 42–78)
SODIUM SERPL-SCNC: 135.7 MMOL/L (ref 137–145)
STOMATOCYTES BLD QL SMEAR: (no result)
TOTAL CELLS COUNTED BLD: 100
TOXIC GRANULES BLD QL SMEAR: (no result)
TROPONIN I SERPL-MCNC: < 0.012 NG/ML
VARIANT LYMPHS NFR BLD MANUAL: 12 % (ref 13–45)
WBC # BLD AUTO: 26.7 10^3/UL (ref 4–10.5)

## 2018-03-12 NOTE — RADIOLOGY REPORT (SQ)
EXAM DESCRIPTION: CTA of the chest per PE protocol with contrast.



CLINICAL HISTORY: chest pain sob 



COMPARISON: 2/8/2018



TECHNIQUE: CTA of the chest obtained following the uncomplicated

intravenous administration of 70 mL Isovue-370. 3-D/MIP

reformatted images of the chest available for evaluation.



FINDINGS: 



Chest: Mediastinal windows demonstrate an adequate contrast

bolus. No pulmonary embolus identified. Suboptimal evaluation of

the lobar, segmental, and subsegmental pulmonary arterial

branches due to contrast bolus timing. No large central pulmonary

embolus identified.   Visualized thyroid gland is unremarkable.  

Great vessels have normal anatomic configuration. No evidence of

right heart strain. No cardiomegaly, pericardial effusion, or

coronary artery atherosclerosis. No abnormalities of the

esophagus.   Calcified mediastinal and hilar lymph nodes. No

lymphadenopathy. Breast implants identified.



Lung windows demonstrate improved aeration of the right lung base

with mild residual right middle lobe atelectatic opacity. Right

basilar calcified granulomas. No pneumothorax or pleural

effusion. Few scattered groundglass opacities again identified

suggesting air trapping. These are improved from the comparison

study.   No abnormalities of the visualized trachea or airways. 



Limited images of the upper abdomen demonstrate no abnormalities

of the visualized  spleen, pancreas, adrenal glands, gallbladder,

or kidneys. The liver is enlarged with diffusely decreased

density.



No destructive osseous lesions.





DLP:  894.80 mGycm



IMPRESSION: 



1. No central pulmonary embolus identified. Suboptimal evaluation

of the lobar, segmental, and subsegmental pulmonary arterial

branches due to contrast bolus timing.



2. Improved aeration of the lungs with significant interval

decrease in groundglass opacification as well as decreased right

middle lobe atelectatic change.



3. Hepatic steatosis and hepatomegaly.





This exam was performed according to our departmental

dose-optimization program, which includes automated exposure

control, adjustment of the mA and/or kV according to patient size

and/or use of iterative reconstruction technique.

## 2018-03-12 NOTE — ER DOCUMENT REPORT
ED General





- General


Chief Complaint: Chest Pain


Stated Complaint: CHEST PAIN/LEG PAIN


Time Seen by Provider: 18 23:35


Mode of Arrival: Ambulatory


Information source: Patient, [facility]


Notes: 





38-year-old female who had a diagnosis of pneumonia for which she was admitted 

in the hospital for 9 days resents with complaints of continued cough.  Patient 

notes she is currently on steroids notes that the cough has improved but she is 

having some chest pain with the cough.  Patient also notes joint pain.  Patient 

notes that she has chronic knee pain but has worsened over the past 3 days 

denies any calf pain calf swelling leg pain otherwise





Patient denies any fevers or chills


TRAVEL OUTSIDE OF THE [place] IN LAST 30 DAYS: No





- HPI


Onset: Other


Onset/Duration: Persistent


Quality of pain: Achy


Severity: Mild


Pain Level: 1


Associated symptoms: Body/muscle aches, Chest pain, Nonproductive cough


Exacerbated by: Movement, Walking, Coughing


Relieved by: Denies


Similar symptoms previously: Yes


Recently seen / treated by doctor: Yes





- Related Data


Allergies/Adverse Reactions: 


 





naproxen [From Aleve] Allergy (Verified 17 13:52)


 











Past Medical History





- Social History


Smoking Status: Former Smoker


Cigarette use (# per day): No


Chew tobacco use (# tins/day): No


Smoking Education Provided: No


Frequency of alcohol use: Rare


Drug Abuse: Marijuana


Family History: Malignancy, Thyroid Disfunction


Patient has suicidal ideation: No


Patient has homicidal ideation: No


Endocrine Medical History: Reports: Hx Hypothyroidism


Renal/ Medical History: Denies: Hx Peritoneal Dialysis


Past Surgical History: Reports: Hx Breast Surgery - augmentation, Hx  

Section - 3, Hx Nose Surgery - Deviated septum, Hx Tonsillectomy, Other - 

Bilateral tubal ligation





- Immunizations


Immunizations up to date: Yes


Hx Diphtheria, Pertussis, Tetanus Vaccination: Yes





Review of Systems





- Review of Systems


Notes: 





REVIEW OF SYSTEMS:


CONSTITUTIONAL :  Denies fever,  chills, or sweats.  Denies recent illness.


EENT:   Denies eye, ear, throat, or mouth pain or symptoms.  Denies nasal or 

sinus congestion or discharge.  Denies throat, tongue, or mouth swelling or 

difficulty swallowing.


CARDIOVASCULAR: Admits to chest pain


RESPIRATORY: Admits to cough


GASTROINTESTINAL:  Denies abdominal pain or distention.  Denies nausea, vomiting

, or diarrhea.  Denies blood in vomitus, stools, or per rectum.  Denies black, 

tarry stools.  Denies constipation. 


GENITOURINARY:  Denies difficulty urinating, painful urination, burning, 

frequency, blood in urine, or discharge.


FEMALE  GENITOURINARY:  Denies vaginal bleeding, heavy or abnormal periods, 

irregular periods.  Denies vaginal discharge or odor. 


MUSCULOSKELETAL: Admits to knee pain


SKIN:   Denies rash, lesions or sores.


HEMATOLOGIC :   Denies easy bruising or bleeding.


LYMPHATIC:  Denies swollen, enlarged glands.


NEUROLOGICAL:  Denies confusion or altered mental status.  Denies passing out 

or loss of consciousness.  Denies dizziness or lightheadedness.  Denies 

headache.  Denies weakness or paralysis or loss of use of either side.  Denies 

problems with gait or speech.  Denies sensory loss, numbness, or tingling.  

Denies seizures.


PSYCHIATRIC:  Denies anxiety or stress.  Denies depression, suicidal ideation, 

or homicidal ideation.





ALL OTHER SYSTEMS REVIEWED AND NEGATIVE.











PHYSICAL EXAMINATION:





GENERAL: Well-appearing, well-nourished and in no acute distress.





HEAD: Atraumatic, normocephalic.





EYES: Pupils equal round and reactive to light, extraocular movements intact, 

conjunctiva are normal.





ENT: Nares patent, oropharynx clear without exudates.  Moist mucous membranes.





NECK: Normal range of motion, supple without lymphadenopathy





LUNGS: Breath sounds clear to auscultation bilaterally and equal.  No wheezes 

rales or rhonchi.





HEART: Regular rate and rhythm without murmurs





ABDOMEN: Soft, nontender, nondistended abdomen.  No guarding, no rebound.  No 

masses appreciated.





Female : deferred





Musculoskeletal: Normal range of motion, no pitting or edema.  No cyanosis.  No 

calf swelling tenderness with range of motion of bilateral knees





NEUROLOGICAL: Cranial nerves grossly intact.  Normal speech, normal gait.  

Normal sensory, motor exams





PSYCH: Normal mood, normal affect.





SKIN: Warm, Dry, normal turgor, no rashes or lesions noted.

















Dictation was performed using Dragon voice recognition software





Physical Exam





- Vital signs


Vitals: 


 











Pulse Ox


 


 100 


 


 18 01:24














Course





- Re-evaluation


Re-evalutation: 





18 02:10


Patient's white count was 26.7, however looking at previous lab work her white 

count has been significantly elevated and she is currently on steroids which 

would explain these abnormal values.  She overall looks well, I do believe she 

is either rheumatoid arthritis or lupus given the extensive history of knee 

pain.  I have very low suspicion for DVT however did perform a CT of the chest 

to rule out a pulmonary emboli given that she was having chest pain with cough 

and was noted to be intermittently tachycardic otherwise the patient looks well 

I had a very long discussion with her and she feels stable at this time I will 

discharge home with strict return precautions








After performing a Medical Screening Examination, I estimate there is LOW risk 

for RUPTURED ESOPHAGUS, PNEUMOTHORAX, PULMONARY EMBOLISM, ACUTE CORONARY 

SYNDROME, OR THORACIC AORTIC DISSECTION, thus I consider the discharge 

disposition reasonable.  I have reevaluated this patient multiple times and no 

significant life threatening changes are noted. The patient and I have 

discussed the diagnosis and risks, and we agree with discharging home with 

close follow-up. We also discussed returning to the Emergency Department 

immediately if new or worsening symptoms occur. We have discussed the symptoms 

which are most concerning (e.g., bloody sputum, worsening pain or shortness of 

breath) that necessitate immediate return.





- Vital Signs


Vital signs: 


 











Temp Pulse Resp BP Pulse Ox


 


             100 


 


             18 01:24














- Laboratory


Result Diagrams: 


 18 23:50





 18 23:50


Laboratory results interpreted by me: 


 











  18





  23:50 23:50


 


WBC  26.7 H 


 


Hgb  15.8 H 


 


RDW  14.1 H 


 


Plt Count  472 H 


 


Lymphocytes % (Manual)  12 L 


 


Abs Neuts (Manual)  20.3 H 


 


Abs Monocytes (Manual)  2.1 H 


 


Sodium   135.7 L


 


Chloride   93 L


 


Carbon Dioxide   31 H


 


ALT   68 H


 


Creatine Kinase   < 20 L














- Diagnostic Test


Radiology reviewed: Image reviewed, Reports reviewed - No pulmonary emboli 

noted report given to patient





- EKG Interpretation by Me


EKG shows normal: Axis, Intervals, QRS Complexes, ST-T Waves


Rate: Tachycardia





Discharge





- Discharge


Clinical Impression: 


Knee pain


Qualifiers:


 Chronicity: chronic Laterality: bilateral Qualified Code(s): M25.561 - Pain in 

right knee; M25.562 - Pain in left knee; M25.562 - Pain in left knee; G89.29 - 

Other chronic pain; G89.29 - Other chronic pain





Chest pain


Qualifiers:


 Chest pain type: chest pain on breathing Qualified Code(s): R07.1 - Chest pain 

on breathing; R07.81 - Pleurodynia





Condition: Stable


Disposition: HOME, SELF-CARE


Instructions:  Chest Pain of Unclear Cause ([facility])


Additional Instructions: 


Follow up with your physician tomorrow for further care or return to the ED 

IMMEDIATELY if symptoms worsen or new concerns occur. If you cannot afford to 

follow up with your primary care physician a list of low cost clinics have been 

provided at the end of your discharge papers as well.


Prescriptions: 


Ketorolac Tromethamine [Toradol 10 mg Tablet] 10 mg PO Q8HP PRN #12 tablet


 PRN Reason: 


Hydrocodone/Acetaminophen [Norco 5-325 mg Tablet] 1 tab PO Q6 #14 tablet

## 2018-05-07 ENCOUNTER — HOSPITAL ENCOUNTER (EMERGENCY)
Dept: HOSPITAL 62 - ER | Age: 38
Discharge: HOME | End: 2018-05-07
Payer: SELF-PAY

## 2018-05-07 VITALS — SYSTOLIC BLOOD PRESSURE: 134 MMHG | DIASTOLIC BLOOD PRESSURE: 90 MMHG

## 2018-05-07 DIAGNOSIS — E03.9: ICD-10-CM

## 2018-05-07 DIAGNOSIS — R51: Primary | ICD-10-CM

## 2018-05-07 DIAGNOSIS — Z87.891: ICD-10-CM

## 2018-05-07 DIAGNOSIS — R05: ICD-10-CM

## 2018-05-07 DIAGNOSIS — J06.9: ICD-10-CM

## 2018-05-07 PROCEDURE — 99284 EMERGENCY DEPT VISIT MOD MDM: CPT

## 2018-05-07 PROCEDURE — 96361 HYDRATE IV INFUSION ADD-ON: CPT

## 2018-05-07 PROCEDURE — 71046 X-RAY EXAM CHEST 2 VIEWS: CPT

## 2018-05-07 PROCEDURE — 96374 THER/PROPH/DIAG INJ IV PUSH: CPT

## 2018-05-07 PROCEDURE — 96375 TX/PRO/DX INJ NEW DRUG ADDON: CPT

## 2018-05-07 NOTE — RADIOLOGY REPORT (SQ)
EXAM DESCRIPTION: CHEST 2 VIEWS



CLINICAL HISTORY: cough



COMPARISON: 3/12/2018



FINDINGS: Frontal and lateral views of the chest.  The

cardiomediastinal silhouette has normal size and contour. No

consolidation, pneumothorax, or pleural effusion.  No displaced

rib fractures identified.  Upper abdominal soft tissues are

unremarkable.



IMPRESSION:



1. No acute pulmonary process identified.

## 2018-05-07 NOTE — ER DOCUMENT REPORT
HPI





- HPI


Patient complains to provider of: Headache, cough


Onset: Other - Headache 2 hours, cough 1 week


Onset/Duration: Persistent


Quality of pain: Achy


Pain Level: 4


Context: 





Patient presents complaining of occipital headache that started 2 hours prior 

to arrival.  Patient states she has had intermittent headaches off and on over 

the past 3 days.  Patient reports occasionally productive cough for the past 

week.  Patient denies any fever.  She denies any head injury.  Patient also 

states that she ran out of her thyroid medication 3 days ago and needs a refill.


Associated Symptoms: Productive cough, Headache.  denies: Chest pain, Earache, 

Nausea, Vomiting


Exacerbated by: Denies


Relieved by: Denies


Similar symptoms previously: Yes


Recently seen / treated by doctor: No





- ROS


ROS below otherwise negative: Yes


Systems Reviewed and Negative: Yes All other systems reviewed and negative





- CONSTITUTIONAL


Constitutional: DENIES: Fever, Chills





- EENT


EENT: DENIES: Sore Throat





- NEURO


Neurology: REPORTS: Headache.  DENIES: Vision blurred





- CARDIOVASCULAR


Cardiovascular: DENIES: Chest pain





- RESPIRATORY


Respiratory: REPORTS: Coughing.  DENIES: Trouble Breathing





- GASTROINTESTINAL


Gastrointestinal: DENIES: Nausea, Patient vomiting





- REPRODUCTIVE


Reproductive: DENIES: Pregnant:





- MUSCULOSKELETAL


Musculoskeletal: DENIES: Back Pain, Neck Pain





- DERM


Skin Color: Normal


Skin Problems: None





Past Medical History





- General


Information source: Patient





- Social History


Smoking Status: Former Smoker


Frequency of alcohol use: None


Drug Abuse: None


Occupation: Foodservice


Lives with: Family


Family History: Malignancy, Thyroid Disfunction





- Medical History


Medical History: Other - Lupus


Endocrine Medical History: Reports: Hx Hypothyroidism


Renal/ Medical History: Denies: Hx Peritoneal Dialysis


Past Surgical History: Reports: Hx Breast Surgery - augmentation, Hx  

Section - 3, Hx Nose Surgery - Deviated septum, Hx Tonsillectomy, Other - 

Bilateral tubal ligation





- Immunizations


Immunizations up to date: Yes


Hx Diphtheria, Pertussis, Tetanus Vaccination: Yes





Vertical Provider Document





- CONSTITUTIONAL


Agree With Documented VS: Yes


Exam Limitations: No Limitations


General Appearance: WD/WN, No Apparent Distress





- INFECTION CONTROL


TRAVEL OUTSIDE OF THE [place] IN LAST 30 DAYS: No





- HEENT


HEENT: Atraumatic, Normocephalic.  negative: Pharyngeal Exudate, Pharyngeal 

Tenderness, Pharyngeal Erythema, Tympanic Membrane Red, Tympanic Membrane 

Bulging


Notes: 





clear rhinorrhea





- NECK


Neck: Normal Inspection, Supple, Other - No meningismus.  negative: 

Lymphadenopathy-Left, Lymphadenopathy-Right





- RESPIRATORY


Respiratory: No Respiratory Distress, Chest Non-Tender, Rhonchi





- CARDIOVASCULAR


Cardiovascular: Regular Rhythm, No Murmur, Tachycardia





- GI/ABDOMEN


Gastrointestinal: Abdomen Soft, Abdomen Non-Tender





- BACK


Back: Normal Inspection.  negative: CVA Tenderness-Right, CVA Tenderness-Left





- MUSCULOSKELETAL/EXTREMETIES


Musculoskeletal/Extremeties: MAEW, FROM





- NEURO


Level of Consciousness: Awake, Alert, Appropriate


Motor/Sensory: No Motor Deficit





- DERM


Integumentary: Warm, Dry





Course





- Re-evaluation


Re-evalutation: 





18 02:56


Patient complains of continued headache pain but states that pain has moved in 

location.  Pain initially was the occipital area and is now to the bilateral 

temporal area.  Additional medications ordered.


18 03:54


Patient reports that headache pain is improved at this time.  The patient 

presents with headache without signs of CNS bleed, stroke, infection, or other 

serious etiology.  The patient is neurologically intact.  Given the extremely 

low risk of these diagnoses further testing and evaluation for these 

possibilities does not appear to be indicated at this time.  The patient has 

been instructed to return if the symptoms worsen or change in any way.





- Vital Signs


Vital signs: 


 











Temp Pulse Resp BP Pulse Ox


 


 97.9 F   106 H  20   134/90 H  96 


 


 18 01:03  18 01:03  18 01:03  18 01:03  18 01:03














- Diagnostic Test


Radiology reviewed: Reports reviewed





Discharge





- Discharge


Clinical Impression: 


 History of hypothyroidism





Headache


Qualifiers:


 Headache type: unspecified Headache chronicity pattern: unspecified pattern 

Intractability: not intractable Qualified Code(s): R51 - Headache





Upper respiratory infection


Qualifiers:


 URI type: unspecified URI Qualified Code(s): J06.9 - Acute upper respiratory 

infection, unspecified





Condition: Stable


Disposition: HOME, SELF-CARE


Instructions:  Headache ([facility]), Hypothyroidism ([facility]), Upper Respiratory Illness (

[facility])


Additional Instructions: 


Return immediately for any new or worsening symptoms





Followup with your primary care provider, call tomorrow to make a followup 

appointment








Prescriptions: 


Butalb/Acetaminophen/Caffeine [Fioricet (-40 mg) Tablet] 1 - 2 tab PO Q4H 

PRN #14 each


 PRN Reason: 


Levothyroxine Sodium 125 mcg PO DAILY #30 tablet


Forms:  Return to Work


Referrals: 


[facility] [Provider Group] - Follow up as needed


[facility] [Provider Group] - Follow up as needed

## 2018-05-11 ENCOUNTER — HOSPITAL ENCOUNTER (EMERGENCY)
Dept: HOSPITAL 62 - ER | Age: 38
Discharge: HOME | End: 2018-05-11
Payer: SELF-PAY

## 2018-05-11 VITALS — DIASTOLIC BLOOD PRESSURE: 99 MMHG | SYSTOLIC BLOOD PRESSURE: 141 MMHG

## 2018-05-11 DIAGNOSIS — R68.83: ICD-10-CM

## 2018-05-11 DIAGNOSIS — J32.9: Primary | ICD-10-CM

## 2018-05-11 DIAGNOSIS — R05: ICD-10-CM

## 2018-05-11 DIAGNOSIS — R11.2: ICD-10-CM

## 2018-05-11 DIAGNOSIS — R19.7: ICD-10-CM

## 2018-05-11 DIAGNOSIS — R11.0: ICD-10-CM

## 2018-05-11 DIAGNOSIS — R51: ICD-10-CM

## 2018-05-11 PROCEDURE — 99283 EMERGENCY DEPT VISIT LOW MDM: CPT

## 2018-05-11 NOTE — ER DOCUMENT REPORT
ED General





- General


Chief Complaint: Cough


Stated Complaint: RESPIRATORY PROBLEM


Time Seen by Provider: 18 20:44


Mode of Arrival: Ambulatory


Information source: Patient


TRAVEL OUTSIDE OF THE [place] IN LAST 30 DAYS: No





- HPI


Patient complains to provider of: cough/sinus pressure


Onset: Other - 4 days ago


Onset/Duration: Worse


Severity: Moderate


Associated symptoms: Nonproductive cough, Headache, Nausea


Similar symptoms previously: Yes


Recently seen / treated by doctor: Yes - here a few days ago





- Related Data


Allergies/Adverse Reactions: 


 





No Known Allergies Allergy (Verified 18 01:00)


 











Past Medical History





- General


Information source: Patient





- Social History


Smoking Status: Never Smoker


Frequency of alcohol use: None


Drug Abuse: None


Lives with: Family


Family History: Malignancy, Thyroid Disfunction


Patient has suicidal ideation: No


Patient has homicidal ideation: No





- Past Medical History


Cardiac Medical History: Reports: None


Pulmonary Medical History: Reports: Hx Bronchitis, Hx Pneumonia


EENT Medical History: Reports: None


Neurological Medical History: Reports: None


Endocrine Medical History: Reports: Hx Hypothyroidism


Renal/ Medical History: Denies: Hx Peritoneal Dialysis


Malignancy Medical History: Reports: None


GI Medical History: Reports: None


Skin Medical History: Reports None


Psychiatric Medical History: Reports: None


Traumatic Medical History: Reports: None


Past Surgical History: Reports: Hx Breast Surgery - augmentation, Hx  

Section - 3, Hx Nose Surgery - Deviated septum, Hx Tonsillectomy, Other - 

Bilateral tubal ligation





- Immunizations


Immunizations up to date: Yes


Hx Diphtheria, Pertussis, Tetanus Vaccination: Yes





Review of Systems





- Review of Systems


Constitutional: Chills


EENT: Ear pain - right, Nose congestion, Sinus pressure


Respiratory: Cough.  denies: Short of breath, Sputum, Wheezing


Gastrointestinal: Diarrhea, Nausea, Vomiting


Genitourinary: No symptoms reported


Female Genitourinary: No symptoms reported


Musculoskeletal: No symptoms reported


Skin: No symptoms reported


Hematologic/Lymphatic: No symptoms reported


Neurological/Psychological: No symptoms reported





Physical Exam





- Vital signs


Vitals: 





 











Temp Pulse Resp BP Pulse Ox


 


 97.7 F   87   22 H  141/99 H  97 


 


 18 20:39  18 20:39  18 20:39  18 20:39  18 20:39














- Notes


Notes: 





PHYSICAL EXAMINATION:





GENERAL: Well-appearing, well-nourished and in no acute distress.





HEAD: Atraumatic, normocephalic.





EYES: Pupils equal round and reactive to light, extraocular movements intact, 

conjunctiva are normal.





ENT: Nares patent, oropharynx clear without exudates.  Moist mucous membranes.  

Left TM within normal limits.  Right TM with erythema loss of landmarks.  With 

percussion of the frontal maxillary sinuses.  Positive nasal congestion.





NECK: Normal range of motion, supple without lymphadenopathy





LUNGS: Breath sounds clear to auscultation bilaterally and equal.  No wheezes 

rales or rhonchi.





HEART: Regular rate and rhythm without murmurs





ABDOMEN: Soft, nontender, nondistended abdomen.  No guarding, no rebound.  No 

masses appreciated.





Female : deferred





Musculoskeletal: Normal range of motion, no pitting or edema.  No cyanosis.





NEUROLOGICAL: Cranial nerves grossly intact.  Normal speech, normal gait.  

Normal sensory, motor exams





PSYCH: Normal mood, normal affect.





SKIN: Warm, Dry, normal turgor, no rashes or lesions noted.





Course





- Vital Signs


Vital signs: 





 











Temp Pulse Resp BP Pulse Ox


 


 97.7 F   87   22 H  141/99 H  97 


 


 18 20:39  18 20:39  18 20:39  18 20:39  18 20:39














Discharge





- Discharge


Clinical Impression: 


 Sinusitis





Condition: Stable


Disposition: HOME, SELF-CARE


Instructions:  Sinusitis ([facility])


Additional Instructions: 


Plenty of fluids.  Antibiotics as prescribed.  Return to the emergency 

department for worsening of symptoms or any other concerns.


Prescriptions: 


Clarithromycin [Biaxin  mg 24hr Tablet] 1,000 mg PO DAILY 14 Days #14 

tab.sr.24h


Ondansetron [Zofran Odt 4 mg Tablet] 1 - 2 tab PO Q4H PRN #15 tab.rapdis


 PRN Reason: For Nausea/Vomiting

## 2019-03-20 ENCOUNTER — HOSPITAL ENCOUNTER (EMERGENCY)
Dept: HOSPITAL 62 - ER | Age: 39
Discharge: HOME | End: 2019-03-20
Payer: SELF-PAY

## 2019-03-20 VITALS — DIASTOLIC BLOOD PRESSURE: 70 MMHG | SYSTOLIC BLOOD PRESSURE: 118 MMHG

## 2019-03-20 DIAGNOSIS — Z79.899: ICD-10-CM

## 2019-03-20 DIAGNOSIS — F40.10: ICD-10-CM

## 2019-03-20 DIAGNOSIS — N76.0: ICD-10-CM

## 2019-03-20 DIAGNOSIS — Z63.5: ICD-10-CM

## 2019-03-20 DIAGNOSIS — Z88.8: ICD-10-CM

## 2019-03-20 DIAGNOSIS — E03.9: ICD-10-CM

## 2019-03-20 DIAGNOSIS — F17.210: ICD-10-CM

## 2019-03-20 DIAGNOSIS — F41.9: Primary | ICD-10-CM

## 2019-03-20 LAB
APPEARANCE UR: CLEAR
APTT PPP: COLORLESS S
BARBITURATES UR QL SCN: NEGATIVE
BILIRUB UR QL STRIP: NEGATIVE
CHLAM PCR: NOT DETECTED
EPITHELIALS (WET MOUNT): (no result)
GLUCOSE UR STRIP-MCNC: NEGATIVE MG/DL
GON PCR: NOT DETECTED
KETONES UR STRIP-MCNC: NEGATIVE MG/DL
METHADONE UR QL SCN: NEGATIVE
NITRITE UR QL STRIP: NEGATIVE
PCP UR QL SCN: NEGATIVE
PH UR STRIP: 6 [PH] (ref 5–9)
PROT UR STRIP-MCNC: NEGATIVE MG/DL
RBCS (WET MOUNT): (no result)
SP GR UR STRIP: 1
T.VAGINALIS (WET MOUNT): (no result)
URINE BENZODIAZEPINES SCREEN: NEGATIVE
URINE COCAINE SCREEN: NEGATIVE
URINE MARIJUANA (THC) SCREEN: NEGATIVE
UROBILINOGEN UR-MCNC: NEGATIVE MG/DL (ref ?–2)
WBCS (WET MOUNT): (no result)
YEAST (WET MOUNT): (no result)

## 2019-03-20 PROCEDURE — 87491 CHLMYD TRACH DNA AMP PROBE: CPT

## 2019-03-20 PROCEDURE — 87591 N.GONORRHOEAE DNA AMP PROB: CPT

## 2019-03-20 PROCEDURE — 81025 URINE PREGNANCY TEST: CPT

## 2019-03-20 PROCEDURE — 80307 DRUG TEST PRSMV CHEM ANLYZR: CPT

## 2019-03-20 PROCEDURE — 99284 EMERGENCY DEPT VISIT MOD MDM: CPT

## 2019-03-20 PROCEDURE — 87210 SMEAR WET MOUNT SALINE/INK: CPT

## 2019-03-20 PROCEDURE — 81001 URINALYSIS AUTO W/SCOPE: CPT

## 2019-03-20 SDOH — SOCIAL STABILITY - SOCIAL INSECURITY: DISRUPTION OF FAMILY BY SEPARATION AND DIVORCE: Z63.5

## 2019-03-20 NOTE — ER DOCUMENT REPORT
ED General





- General


Chief Complaint: Altered Mental Status


Stated Complaint: ALTERED MENTAL STATUS


Time Seen by Provider: 19 00:49


Mode of Arrival: Ambulatory


Information source: Patient, Friend


Notes: 





39-year-old female with anxiety, hypothyroidism presents with chief complaint of

altered mental status.  Patient drove herself to the emergency department and 

states that she does not think that she is speaking right.  Patient denies any 

headache, weakness, slurred speech, head injury.  She does report that she had 

for anxiety attacks today after her  told her that he wanted a divorce 

and that he was taking custody of the children because she is a "bad mom".  

Patient is alert and oriented x4.  Patient then begins talking about her chronic

abdominal pain, chronic vaginal irritation and reports that she has green 

discharge from her vagina.  She also reports that she has not been sexually 

active in 3 years and had recent STD testing which was negative.  She denies any

recent antibiotic use.  Patient states she has been compliant with her 

levothyroxine and recently had her levels tested and were normal.  Denies 

suicidal, homicidal ideation


TRAVEL OUTSIDE OF THE [place] IN LAST 30 DAYS: No





- HPI


Onset: Other


Onset/Duration: Intermittent


Quality of pain: Burning, Other - Itching


Severity: Mild


Associated symptoms: denies: Chest pain, Fever, Nausea, Vomiting, Shortness of 

breath


Exacerbated by: Walking


Relieved by: Denies


Similar symptoms previously: Yes


Recently seen / treated by doctor: Yes





- Related Data


Allergies/Adverse Reactions: 


                                        





naproxen [From Aleve] Allergy (Verified 19 00:25)


   











Past Medical History





- General


Information source: Patient, Friend, [facility] Records





- Social History


Smoking Status: Current Some Day Smoker


Cigarette use (# per day): Yes - 4/week


Frequency of alcohol use: None


Drug Abuse: None


Lives with: Family


Family History: Malignancy, Thyroid Disfunction


Patient has suicidal ideation: No


Patient has homicidal ideation: No


Pulmonary Medical History: Reports: Hx Bronchitis, Hx Pneumonia


Endocrine Medical History: Reports: Hx Hypothyroidism


Renal/ Medical History: Denies: Hx Peritoneal Dialysis


Past Surgical History: Reports: Hx Breast Surgery - augmentation, Hx  

Section - 3, Hx Nose Surgery - Deviated septum, Hx Tonsillectomy, Other - 

Bilateral tubal ligation





- Immunizations


Immunizations up to date: Yes


Hx Diphtheria, Pertussis, Tetanus Vaccination: Yes





Review of Systems





- Review of Systems


Notes: 





REVIEW OF SYSTEMS:


CONSTITUTIONAL :  Denies fever,  chills, or sweats.  Denies recent illness. 

Denies weight loss, recent hospitalizations. 


EENT: Denies visual changes, eye pain.  Denies sore throat, oral lesions, 

difficulty swallowing.


CARDIOVASCULAR:  Denies chest pain.  Denies palpitations. Denies lower extremity

edema.


RESPIRATORY:  Denies cough. Denies shortness of breath, wheezing.


GASTROINTESTINAL:  Denies abdominal pain or distention.  Denies nausea, 

vomiting, or diarrhea.  Denies blood in vomitus, stools, or per rectum.  Denies 

black, tarry stools.  Denies constipation.  


GENITOURINARY:  Denies difficulty urinating,   frequency, blood in urine, 


MUSCULOSKELETAL:  Denies back or neck pain or stiffness.  Denies joint pain or 

swelling.


SKIN:   Denies rash, lesions or sores.


HEMATOLOGIC :   Denies easy bruising or bleeding.


LYMPHATIC:  Denies swollen glands.


NEUROLOGICAL:    Denies loss of consciousness.  Denies dizziness or 

lightheadedness.  Denies headache.  Denies weakness or paralysis.  Denies 

problems difficulty with ambulation, slurred speech.  Denies sensory loss, n

umbness, or tingling.  Denies seizures.


PSYCHIATRIC:    Denies depression, suicidal ideation, or homicidal ideation.  

Denies visual or auditory hallucinations.








Physical Exam





- Vital signs


Vitals: 


                                        











Temp Pulse Resp BP Pulse Ox


 


 97.8 F   123 H  24 H  143/77 H  100 


 


 19 00:33  19 00:33  19 00:33  19 00:33  19 00:33














- Notes


Notes: 





PHYSICAL EXAMINATION:





GENERAL: Anxious, pressured speech





HEAD: Atraumatic, normocephalic.





EYES: Pupils equal round and reactive to light, extraocular movements intact, 

conjunctiva are normal.





ENT: Nares patent, oropharynx clear without exudates.  Moist mucous membranes.





NECK: Normal range of motion, supple without lymphadenopathy





LUNGS: Breath sounds clear to auscultation bilaterally and equal.  No wheezes 

rales or rhonchi.





HEART: Regular rate and rhythm without murmurs





ABDOMEN: Soft, nontender, nondistended abdomen.  No guarding, no rebound.  No 

masses appreciated.





Female : Pelvic exam; External extensive excoriation of the labia, inguinal 

folds.  Excoriations of the inner thighs. Speculum exam with no discharge.  

Vaginal wall unremarkable.  Os closed.  No cervical motion tenderness.  No 

adnexal tenderness or masses appreciated.  Swabs obtained for gonorrhea, 

chlamydia and wet prep.





Musculoskeletal: Normal range of motion, no pitting or edema.  No cyanosis.





NEUROLOGICAL: Cranial nerves grossly intact.  Normal speech, normal gait.  

Normal sensory, motor exams.  NIH-0





PSYCH: Anxious, pressured speech, denies suicidal, homicidal ideation





SKIN: Warm, Dry, normal turgor, no rashes or lesions noted.





Course





- Re-evaluation


Re-evalutation: 





Laboratory











  19





  01:20 01:20 02:00


 


Urine Color    COLORLESS


 


Urine Appearance    CLEAR


 


Urine pH    6.0


 


Ur Specific [place]    1.001


 


Urine Protein    NEGATIVE


 


Urine Glucose (UA)    NEGATIVE


 


Urine Ketones    NEGATIVE


 


Urine Blood    NEGATIVE


 


Urine Nitrite    NEGATIVE


 


Urine Bilirubin    NEGATIVE


 


Urine Urobilinogen    NEGATIVE


 


Ur Leukocyte Esterase    NEGATIVE


 


Urine WBC (Auto)    0


 


Squamous Epi Cells Auto    1


 


Urine Mucus (Auto)    RARE


 


Urine Ascorbic Acid    NEGATIVE


 


Urine HCG, Qual    NEGATIVE


 


Epi Cells (Wet Prep)   3+ EPITHELIALS SEEN 


 


Trichomonas (Wet Prep)   NO TRICHOMONAS SEEN 


 


Vaginal WBC   NO WBCS SEEN 


 


Vaginal RBC   NO RBCS SEEN 


 


Vaginal Yeast   NO YEAST SEEN 


 


Urine Opiates Screen   


 


Urine Methadone Screen   


 


Ur Barbiturates Screen   


 


Ur Phencyclidine Scrn   


 


Ur Amphetamines Screen   


 


U Benzodiazepines Scrn   


 


Urine Cocaine Screen   


 


U Marijuana (THC) Screen   


 


Chlamydia DNA (PCR)  NOT DETECTED  


 


N.gonorrhoeae DNA (PCR)  NOT DETECTED  














  19





  02:00


 


Urine Color 


 


Urine Appearance 


 


Urine pH 


 


Ur Specific Gravity 


 


Urine Protein 


 


Urine Glucose (UA) 


 


Urine Ketones 


 


Urine Blood 


 


Urine Nitrite 


 


Urine Bilirubin 


 


Urine Urobilinogen 


 


Ur Leukocyte Esterase 


 


Urine WBC (Auto) 


 


Squamous Epi Cells Auto 


 


Urine Mucus (Auto) 


 


Urine Ascorbic Acid 


 


Urine HCG, Qual 


 


Epi Cells (Wet Prep) 


 


Trichomonas (Wet Prep) 


 


Vaginal WBC 


 


Vaginal RBC 


 


Vaginal Yeast 


 


Urine Opiates Screen  NEGATIVE


 


Urine Methadone Screen  NEGATIVE


 


Ur Barbiturates Screen  NEGATIVE


 


Ur Phencyclidine Scrn  NEGATIVE


 


Ur Amphetamines Screen  


 


U Benzodiazepines Scrn  NEGATIVE


 


Urine Cocaine Screen  NEGATIVE


 


U Marijuana (THC) Screen  NEGATIVE


 


Chlamydia DNA (PCR) 


 


N.gonorrhoeae DNA (PCR) 











                                        











Temp Pulse Resp BP Pulse Ox


 


 97.8 F   116 H  29 H  139/97 H  99 


 


 19 00:33  19 00:50  19 01:01  19 01:01  19 01:01











19 01:26


39-year-old female presents with chief complaint of altered mental status.  Upon

my exam patient is alert, awake and alert and oriented x4.  She has no abnormal 

speech, normal neurologic exam, ambulates without difficulty.  Patient denies 

homicidal, suicidal ideation.    Does report that her  recently told her 

that 


19 03:30


Patient urine drug screen is positive for amphetamines.  This would correlate 

with her behavior.  Patient administered Valium.  She is declining behavioral 

health evaluation in the morning.  She states that she will be staying with her 

best friend tonight.  Will be discharged home with nystatin powder, clotrimazole

cream.  Patient was evaluated and treated as appropriate for the patient's 

presenting symptoms and complaint, with consideration of any critical or life 

threatening conditions that may be associated with their obtained history and 

exam as noted above. All results were discussed with patient and her friend who 

is at the bedside. Patient provided the opportunity to ask questions, and 

express concerns. Patient was educated on treatments based on their presumed 

diagnosis as noted above.  At this time we will discharge the patient with 

return precautions and follow-up recommendations.  Verbal discharge instructions

given a the bedside. Medication warnings reviewed. Patient is in agreement with 

this plan and has verbalized understanding of return precautions. 





After careful consideration I feel that that patient can be safely discharged 

from the emergency department,  they were advised to followup with a primary 

care physician in 2-3 days. 








Dictation on this chart was performed using voice recognition software and may 

result in unintended grammatical, spelling, syntax or errors.














19 04:01








- Vital Signs


Vital signs: 


                                        











Temp Pulse Resp BP Pulse Ox


 


 98.5 F   98   18   118/70   98 


 


 19 03:40  19 03:40  19 03:40  19 03:40  19 03:40














Discharge





- Discharge


Clinical Impression: 


 Vaginitis and vulvovaginitis, Anxiety about health, Social anxiety disorder





Condition: Good


Disposition: HOME, SELF-CARE


Instructions:  Anxiety ([facility]), Vaginitis ([facility])


Additional Instructions: 


Follow up with your [id number] hours  for further care or return to the ED

IMMEDIATELY if symptoms worsen or you have any concerns.  If you cannot afford 

to follow up with your primary care physician a list of low cost clinics have 

been provided at the end of your discharge papers as well.











Most prescribed medications have multiple side effects.  The safest thing to do 

is when filling your prescription  speak to your pharmacist regarding possible 

interactions with your normal home medications and over the counter medications 

such as Ibuprofen, Tylenol, Benadryl.  If you experience any symptoms that cause

you discomfort or concern you should discontinue the medication immediately and 

return to the emergency room or call your primary care physician.


Prescriptions: 


Clotrimazole 1% Topical [Lotrimin 1% Topical Soln 10 ml] 15 applic TP TID #1 

bottle


Fluconazole [Diflucan] 150 mg PO ONCE PRN 1 Days #1 tablet


 PRN Reason: 


Nystatin 1 each MC BID #1 powder.ea.


Forms:  Elevated Blood Pressure





ED NIH Stroke Scale





- NIH Stroke Scale


*: 1. NIH scale should be completed with appropriate accompanying assessment 

tools.


*: 2. The NIH should reflect what the patient is capable of doing and should not

 be coached by the clinician.


1a. Level of Consciousness: 0=Alert;keenly responsive


-: 1=Drowsy


-: 2=Obtunded


-: 3=Coma/unresponsive or reflex to noxious stimuli.


1a. Responses: 0


1b. Orientation Questions: a. What month is it?


-: b. How old are you?


-: 0=Answers both questions correctly.


-: 1=Answers one question correctly or patient is intubated or has orotracheal 

trauma.


-: 2=Answers neither question correctly.


1b. Responses: 0


1c. Response to commands: a. Open and close eyes?


-: b.  and release hand?


-: Credit is given despite weakness. Demonstration of task is permitted. 

Substitute command if hands cannot be used.


-: 0=Performs both tasks correctly


-: 1=Performs one task correctly


-: 2=Performs neither task correctly


1c. Responses: 0


2. Gaze: Establish eye contact and instruct patient to "Follow my finger"


-: 0=Normal


-: 1=Partial gaze palsy. Gaze is abnormal in one or both eyes, but where forced 

deviation or total gaze paresis is not present.


-: 2=Forced deviation or total gaze paresis.


2. Responses: 0


3. Visual Fields: Sees fingers in all four quadrants.


-: 0=No visual loss.


-: 1=Partial hemianopsia.


-: 2=Complete hemianopsia.


-: 3=Bilateral hemianopsia (including Cortical blindness)


3. Responses: 0


4. Facial Movement: Instruct patient to:


-: a. Show me your teeth


-: b. Raise your eyebrows


-: c. Close your eyes


-: d. Smile


-: 0=Normal symmetrical movement


-: 1=Minor paralysis (flattened nasolabial fold, asymmetry on smiling).


-: 2=Partial paralysis (total or near total paralysis of lower face).


-: 3=Complete paralysis of upper and lower face


4. Responses: 0


5. Motor functions (left arm): Alternate sides and extend each arm with palms 

down (90 degrees if sitting or 45 degrees for supine).


-: 0=No drift;limb holds for full 10 seconds.


-: 1=Drift; limb holds but drifts down before full 10 seconds, but does not hit 

bed.


-: 2=Some effort against gravity; limb cannot get to or maintain position.


-: 3=No effort against gravity; limb falls.


-: 4=No movement.


-: UN=Amputation, joint fusion, explain in comments.


5. Responses (left arm): 0


5. Motor Functions (right arm): Alternate sides and extend each arm with palms 

down (90 degrees if sitting or 45 degrees for supine).


-: 0=No drift;limb holds for full 10 seconds.


-: 1=Drift; limb holds but drifts down before full 10 seconds, but does not hit 

bed.


-: 2=Some effort against gravity; limb cannot get to or maintain position.


-: 3=No effort against gravity; limb falls.


-: 4=No movement.


-: UN=Amputation, joint fusion, explain in comments.


5. Responses (right arm): 0


6. Motor Functions (left leg): With patient lying supine, alternate sides and 

extend each leg (30 degrees always while supine).


-: 0=No drift, leg holds position for full 5 seconds


-: 1=Drift; leg falls before full 5 seconds but does not hit bed.


-: 2=Some effort against gravity, leg falls to bed but some effort against 

gravity.


-: 3=No effort against gravity, leg falls to bed immediately.


-: 4=No movement.


-: UN=Amputation, joint fusion; explain in comments.


6. Responses (left leg): 0


6. Motor Functions (right leg): With patient lying supine, alternate sides and 

extend each leg (30 degrees always while supine).


-: 0=No drift, leg holds position for full 5 seconds


-: 1=Drift; leg falls before full 5 seconds but does not hit bed.


-: 2=Some effort against gravity, leg falls to bed but some effort against 

gravity.


-: 3=No effort against gravity, leg falls to bed immediately.


-: 4=No movement.


-: UN=Amputation, joint fusion; explain in comments.


6. Responses (right leg): 0


7. Limb Ataxia: With eyes open instruct patient to:


-: a. "Touch your finger to your nose".


-: b. "Touch your heel to your shin"


-: 0=Absent


-: 1=Present in one limb.


-: 2=Present in two limbs.


-: UN=Amputation or joint fusion; explain in comments.


7. Responses: 0


8. Sensory: Test sensation using pinprick or noxious stimuli.  Test as many body

 parts as possible.


-: 0=Normal;no sensory loss


-: 1=Mile to moderate sensory loss (patient feels pin prick but is less sharp on

 affected side).


-: 2=Severe or total sensory loss.


8. Responses: 0


9. Best Language: Instruct patient to:


-: a. "Describe what you see in this picture."


-: b. "Name the items in this picture."


-: c. "Read these sentences."


-: 0=No aphasia, normal


-: 1=Mild to moderate aphasia.


-: 2=Severe aphasia


-: 3=Mute, global aphasia, no usable speech or auditory comprehension.


9. Responses: 0


10. Articulation, Dysarthia: Instruct patient to:


-: "Read these words" or "Repeat these words"


-: 0=Normal


-: 1=Mild to moderate; patient may slur some words but can be understood without

 difficulty.


-: 2=Severe; patients speech so slurred as to be unintelligible in the absence 

of dysphasia.


-: UN=Intubated or other physical barrier, explain in comments.


10. Responses: 0


11. Extinction or inattention: 0=No abnormality


-: 1= Visual, tactile, auditory, spatial, or personal inattention or extinction 

to bilateral simulation in one or the sensory modalities.


-: 2=Profound adam-inattention or adam-inattention to more than one modality; do

es not recognize own hand.


11. Responses: 0


Total Score: 0

## 2019-09-15 NOTE — ER DOCUMENT REPORT
[facility] UROLOGY  PROGRESS NOTE    Chief Complaint:  Abdominal Pain    Subjective:  Sitting up on bedside  Tearful   Upset about her BP  Reports her pain is okay but she is nauseated  She denies vomiting   She denies fevers     Review of Systems   Constitutional: Negative for chills and fever. Respiratory: Negative. Cardiovascular: Negative. Gastrointestinal: Positive for abdominal pain and nausea. Negative for diarrhea and vomiting. Genitourinary: Positive for pelvic pain. Negative for flank pain and hematuria. Musculoskeletal: Positive for arthralgias and gait problem. Neurological: Negative for dizziness, light-headedness and headaches. Psychiatric/Behavioral: Positive for decreased concentration. The patient is nervous/anxious. Objective:     Vitals:    09/15/19 1100   BP: (!) 98/51   Pulse:    Resp:    Temp:    SpO2:        Allergies: Patient has no known allergies.     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 ([facility] 6/29/2019    Cellulitis     LUE    Chronic kidney disease     Chronic major depressive disorder, recurrent episode (HCC)     Constipation, unspecified     Depression     Difficulty walking     Dysmenorrhea, unspecified     Dysphagia, oropharyngeal     Hematuria     Hydronephrosis     Hyperlipidemia     Hypertension     Hypothyroidism     Intellectual disability     Leg pain, bilateral     Mild intermittent asthma, uncomplicated     Mild protein-calorie malnutrition (HCC)     Mixed incontinence     Muscle weakness (generalized)     Neuromuscular dysfunction of bladder     Noncompliance with medications     Noncompliance with treatment     Obstructive and reflux uropathy     ED Medical Screen (RME)





- General


Stated Complaint: SIDE PAIN


Time seen by provider: 17:37


Mode of Arrival: Ambulatory


Information source: Patient


TRAVEL OUTSIDE OF THE [place] IN LAST 30 DAYS: No





- HPI


Patient complains to provider of: RIGHT HIP PAIN


Onset: Other - 1 1/2 WEEKS


Onset/Duration: Constant, Persistent


Context: 


DX WITH PNEUMONIA, FINISH ANTIBIOTICS 10 DAYS AGO.  STILL FEELING FATIGUED, WEAK

, COUGHING, SOB


Severity: Moderate


Pain Level: 4


Associated Symptoms: Chills, Cough (productive), Diarrhea - N/V/D STARTED 

YESTERDAY, Dysuria - C/O URINARY FREQUENCY, Hurts to breath, Nausea, Shortness 

of breath, Vomiting, Other - PAIN RADIATES TO RIGHT HIP.  denies: Fever


Exacerbated by: Coughing, Deep breathing


Relieved by: Denies


Similar symptoms previously: Yes


Recently seen / treated by doctor: Yes





- Related Data


Smoking: Quit less than 1 year


Frequency of alcohol use: Occasional


Drug Abuse: None


Allergies/Adverse Reactions: 


 





naproxen [From Aleve] Allergy (Verified 17 17:36)


 











Past Medical History


Endocrine Medical History: Reports: Hx Hypothyroidism


Renal/ Medical History: Denies: Hx Peritoneal Dialysis


Past Surgical History: Reports: Hx Breast Surgery, Hx  Section - 3, Hx 

Nose Surgery - Deviated septum, Hx Tonsillectomy





- Immunizations


Immunizations up to date: Yes





Physical Exam





- Vital signs


Vitals: 


 











Temp Pulse Resp BP Pulse Ox


 


 97.8 F   80   14   119/69   99 


 


 17 17:12  17 17:12  17 17:12  17 17:12  17 17:12














Course





- Vital Signs


Vital signs: 


 











Temp Pulse Resp BP Pulse Ox


 


 97.8 F   80   14   119/69   99 


 


 17 17:12  17 17:12  17 17:12  17 17:12  17 17:12

## 2020-02-04 ENCOUNTER — HOSPITAL ENCOUNTER (EMERGENCY)
Dept: HOSPITAL 62 - ER | Age: 40
LOS: 1 days | Discharge: HOME | End: 2020-02-05
Payer: SELF-PAY

## 2020-02-04 DIAGNOSIS — R00.0: ICD-10-CM

## 2020-02-04 DIAGNOSIS — Z79.84: ICD-10-CM

## 2020-02-04 DIAGNOSIS — L03.90: Primary | ICD-10-CM

## 2020-02-04 DIAGNOSIS — Z88.8: ICD-10-CM

## 2020-02-04 DIAGNOSIS — Z79.899: ICD-10-CM

## 2020-02-04 DIAGNOSIS — R73.03: ICD-10-CM

## 2020-02-04 DIAGNOSIS — F12.10: ICD-10-CM

## 2020-02-04 DIAGNOSIS — B37.49: ICD-10-CM

## 2020-02-04 DIAGNOSIS — E03.9: ICD-10-CM

## 2020-02-04 PROCEDURE — 80053 COMPREHEN METABOLIC PANEL: CPT

## 2020-02-04 PROCEDURE — 96375 TX/PRO/DX INJ NEW DRUG ADDON: CPT

## 2020-02-04 PROCEDURE — 36415 COLL VENOUS BLD VENIPUNCTURE: CPT

## 2020-02-04 PROCEDURE — 96374 THER/PROPH/DIAG INJ IV PUSH: CPT

## 2020-02-04 PROCEDURE — 81001 URINALYSIS AUTO W/SCOPE: CPT

## 2020-02-04 PROCEDURE — 99284 EMERGENCY DEPT VISIT MOD MDM: CPT

## 2020-02-04 PROCEDURE — 85025 COMPLETE CBC W/AUTO DIFF WBC: CPT

## 2020-02-04 PROCEDURE — 84703 CHORIONIC GONADOTROPIN ASSAY: CPT

## 2020-02-04 PROCEDURE — 96361 HYDRATE IV INFUSION ADD-ON: CPT

## 2020-02-05 VITALS — DIASTOLIC BLOOD PRESSURE: 81 MMHG | SYSTOLIC BLOOD PRESSURE: 129 MMHG

## 2020-02-05 LAB
ADD MANUAL DIFF: NO
ALBUMIN SERPL-MCNC: 5 G/DL (ref 3.5–5)
ALP SERPL-CCNC: 141 U/L (ref 38–126)
ANION GAP SERPL CALC-SCNC: 12 MMOL/L (ref 5–19)
APPEARANCE UR: CLEAR
APTT PPP: (no result) S
AST SERPL-CCNC: 25 U/L (ref 14–36)
BASOPHILS # BLD AUTO: 0.1 10^3/UL (ref 0–0.2)
BASOPHILS NFR BLD AUTO: 1 % (ref 0–2)
BILIRUB DIRECT SERPL-MCNC: 0 MG/DL (ref 0–0.4)
BILIRUB SERPL-MCNC: 0.5 MG/DL (ref 0.2–1.3)
BILIRUB UR QL STRIP: NEGATIVE
BUN SERPL-MCNC: 10 MG/DL (ref 7–20)
CALCIUM: 10.8 MG/DL (ref 8.4–10.2)
CHLORIDE SERPL-SCNC: 100 MMOL/L (ref 98–107)
CO2 SERPL-SCNC: 28 MMOL/L (ref 22–30)
EOSINOPHIL # BLD AUTO: 0.2 10^3/UL (ref 0–0.6)
EOSINOPHIL NFR BLD AUTO: 1 % (ref 0–6)
ERYTHROCYTE [DISTWIDTH] IN BLOOD BY AUTOMATED COUNT: 14.4 % (ref 11.5–14)
GLUCOSE SERPL-MCNC: 93 MG/DL (ref 75–110)
GLUCOSE UR STRIP-MCNC: NEGATIVE MG/DL
HCT VFR BLD CALC: 44.8 % (ref 36–47)
HGB BLD-MCNC: 15.3 G/DL (ref 12–15.5)
KETONES UR STRIP-MCNC: NEGATIVE MG/DL
LYMPHOCYTES # BLD AUTO: 2.6 10^3/UL (ref 0.5–4.7)
LYMPHOCYTES NFR BLD AUTO: 16.9 % (ref 13–45)
MCH RBC QN AUTO: 30.7 PG (ref 27–33.4)
MCHC RBC AUTO-ENTMCNC: 34.2 G/DL (ref 32–36)
MCV RBC AUTO: 90 FL (ref 80–97)
MONOCYTES # BLD AUTO: 0.9 10^3/UL (ref 0.1–1.4)
MONOCYTES NFR BLD AUTO: 5.9 % (ref 3–13)
NEUTROPHILS # BLD AUTO: 11.4 10^3/UL (ref 1.7–8.2)
NEUTS SEG NFR BLD AUTO: 75.2 % (ref 42–78)
NITRITE UR QL STRIP: NEGATIVE
PH UR STRIP: 6 [PH] (ref 5–9)
PLATELET # BLD: 441 10^3/UL (ref 150–450)
POTASSIUM SERPL-SCNC: 4.7 MMOL/L (ref 3.6–5)
PROT SERPL-MCNC: 8.5 G/DL (ref 6.3–8.2)
PROT UR STRIP-MCNC: NEGATIVE MG/DL
RBC # BLD AUTO: 5 10^6/UL (ref 3.72–5.28)
SP GR UR STRIP: 1
TOTAL CELLS COUNTED % (AUTO): 100 %
UROBILINOGEN UR-MCNC: NEGATIVE MG/DL (ref ?–2)
WBC # BLD AUTO: 15.1 10^3/UL (ref 4–10.5)

## 2020-02-05 NOTE — ER DOCUMENT REPORT
ED General





- General


Chief Complaint: Wound Infection


Stated Complaint: LEFT LEG WOUND


Time Seen by Provider: 20 00:05


Mode of Arrival: Ambulatory


Notes: 


Patient is a 39-year-old female that comes to the emergency department for chief

complaint of an area to the left groin that is tender, erythematous, and draini

ng purulent drainage.  She states this has been there for over a year, she 

states that she did see primary care and they put on antibiotics, she states it 

got a lot better but after stopping the antibiotics several months ago it became

worse again.  She denies fever, vomiting, vaginal discharge, bleeding.  She 

states she has a history of "borderline diabetes" and is on metformin for this, 

she is also treated for hypothyroidism, had a  and tubal ligation, and 

has a history of anxiety.  She reports marijuana but denies IV drug abuse.





TRAVEL OUTSIDE OF THE [place] IN LAST 30 DAYS: No





- Related Data


Allergies/Adverse Reactions: 


                                        





naproxen [From Aleve] Allergy (Verified 20 00:04)


   











Past Medical History





- General


Information source: Patient





- Social History


Smoking Status: Never Smoker


Chew tobacco use (# tins/day): No


Frequency of alcohol use: Rare


Drug Abuse: Marijuana


Lives with: Family


Family History: Malignancy, Thyroid Disfunction


Patient has suicidal ideation: No


Patient has homicidal ideation: No


Pulmonary Medical History: Reports: Hx Bronchitis, Hx Pneumonia


Endocrine Medical History: Reports: Hx Hypothyroidism


Renal/ Medical History: Denies: Hx Peritoneal Dialysis


Past Surgical History: Reports: Hx Breast Surgery - augmentation, Hx  

Section - 3, Hx Nose Surgery - Deviated septum, Hx Tonsillectomy, Other - 

Bilateral tubal ligation





- Immunizations


Immunizations up to date: Yes


Hx Diphtheria, Pertussis, Tetanus Vaccination: Yes





Review of Systems





- Review of Systems


Constitutional: No symptoms reported


EENT: No symptoms reported


Cardiovascular: No symptoms reported


Respiratory: No symptoms reported


Gastrointestinal: See HPI


Genitourinary: See HPI


Female Genitourinary: See HPI


Musculoskeletal: No symptoms reported


Skin: See HPI


Hematologic/Lymphatic: No symptoms reported


Neurological/Psychological: No symptoms reported





Physical Exam





- Vital signs


Vitals: 


                                        











Temp Pulse Resp BP Pulse Ox


 


 97.9 F   132 H  20   122/77   96 


 


 20 23:09  20 23:09  20 23:09  20 23:09  02/04/20 23:09














- Notes


Notes: 


GENERAL: Somewhat uncomfortable and anxious in appearance but no severe distress


HEAD: Normocephalic, atraumatic.


EYES: Pupils equal, round, and reactive to light. Extraocular movements intact.


ENT: Oral mucosa moist, tongue midline. Oropharynx unremarkable. Airway patent. 

Nares patent, no nasal septal hematoma, TM's intact.


NECK: Full range of motion. Supple. Trachea midline.


LUNGS: Clear to auscultation bilaterally, no wheezes, rales, or rhonchi. No 

respiratory distress.


HEART: Borderline tachycardia, normal rhythm, no murmur


ABDOMEN: Soft, non-tender. Non-distended. Bowel sounds present in all 4 

quadrants.


GENITOURINARY: Over both inguinal areas there is an obvious candidal rash with 

erythema, there is also small amount of skin breakdown and in this area there is

some cellulitis extending away from this.  This is similar in both inguinal 

regions but much worse on the left.  There is no induration or fluctuance, there

is mild skin debridement in both areas but this is minimal.  There is no 

necrotic skin or severe tenderness.  Near the anus there is also a candidal rash

without noted cellulitis or abscess.  Nontender perineum.  No perianal abscess 

noted.  Exam performed with Karen BURGOS assisting.


EXTREMITIES: Moves all 4 extremities spontaneously. No edema, normal radial and 

dorsalis pedis pulses bilaterally. No cyanosis.


BACK: no cervical, thoracic, lumbar midline tenderness. No saddle anesthesia, 

normal distal neurovascular exam. Moves all extremities in full range of motion.


NEUROLOGICAL: Alert and oriented x3. Normal speech. Cranial nerves II through 

XII grossly intact. 


PSYCH: Anxious, speaks rapidly


SKIN: Warm, dry, normal turgor. No rashes or lesions noted.








Course





- Re-evaluation


Re-evalutation: 


CBC does show leukocytosis at 15,000 with elevation of neutrophils but no 

bandemia.  Patient was tachycardic but anxious, after discussion and examination

she calmed down and her tachycardia resolved.  She is not hypotensive or 

febrile.  She does not have diabetes.  Remaining work-up unremarkable.





Exam indicates inguinal candidal rash with secondary cellulitis.  There is no 

abscess noted in this area or the perianal area.  Perianal area also has some 

candidal rash.  Patient is sweaty and has a lot of moisture in these areas.  I 

explained the importance of the area being dry, discussed the nature of the 

rash, discussed treatment including oral and topical antifungal and antibiotic 

management, discussed wound care, follow-up, return precautions.  Patient states

understanding and agreement.





- Vital Signs


Vital signs: 


                                        











Temp Pulse Resp BP Pulse Ox


 


 97.4 F   100   18   129/81 H  94 


 


 20 04:47  20 04:47  20 04:47  20 04:47  20 04:47














- Laboratory


Result Diagrams: 


                                 20 01:38





                                 20 01:38


Laboratory results interpreted by me: 


                                        











  20





  01:38 01:38 01:38


 


WBC  15.1 H  


 


RDW  14.4 H  


 


Absolute Neuts (auto)  11.4 H  


 


Calcium   10.8 H 


 


Alkaline Phosphatase   141 H 


 


Total Protein   8.5 H 


 


Ur Leukocyte Esterase    TRACE H














Discharge





- Discharge


Clinical Impression: 


 Skin infection





Cellulitis


Qualifiers:


 Site of cellulitis: unspecified site Qualified Code(s): L03.90 - Cellulitis, 

unspecified





Disposition: HOME, SELF-CARE


Additional Instructions: 


Your evaluation shows areas of Candida (fungal) infection with secondary 

bacterial infection.


Take the doxycycline as prescribed, afterwards take the Diflucan.  


Keep the area is very dry.  Apply the topical antifungal as prescribed as the 

area begins to clear.





Follow-up with primary care.  Come back if you are worse including spreading 

redness, spiking fever, vomiting, or any other concerning or worsening symptoms.


Prescriptions: 


Clotrimazole [Antifungal] 1 dose TP ASDIR PRN #1 cream..g.


 PRN Reason: 


Fluconazole [Diflucan] 150 mg PO ONCE PRN #3 tablet


 PRN Reason: 


Doxycycline Hyclate [Vibramycin 100 mg Tablet] 100 mg PO BID 7 Days #14 tablet


Forms:  Return to Work

## 2020-02-05 NOTE — ER DOCUMENT REPORT
ED Medical Screen (RME)





- General


Chief Complaint: Wound Infection


Stated Complaint: LEFT LEG WOUND


Time Seen by Provider: 20 00:05


Mode of Arrival: Ambulatory


Information source: Patient


Notes: 





Patient presents complaining of chronic wound to the left groin area that is 

worsened recently.  Patient states that she cannot take the pain anymore which 

prompted her visit tonight.  Patient does complain of vaginal swelling.  Patient

does report purulent drainage from the wound.  Patient denies any fever.  

Patient states the wound has been present for the past year.





I have greeted and performed a rapid initial assessment of this patient.  A 

comprehensive ED assessment and evaluation of the patient, analysis of test 

results and completion of the medical decision making process will be conducted 

by additional ED providers.


TRAVEL OUTSIDE OF THE [place] IN LAST 30 DAYS: No





- Related Data


Allergies/Adverse Reactions: 


                                        





naproxen [From Aleve] Allergy (Verified 20 00:04)


   











Past Medical History


Pulmonary Medical History: Reports: Hx Bronchitis, Hx Pneumonia


Endocrine Medical History: Reports: Hx Hypothyroidism


Renal/ Medical History: Denies: Hx Peritoneal Dialysis


Past Surgical History: Reports: Hx Breast Surgery - augmentation, Hx  

Section - 3, Hx Nose Surgery - Deviated septum, Hx Tonsillectomy, Other - 

Bilateral tubal ligation





- Immunizations


Immunizations up to date: Yes


Hx Diphtheria, Pertussis, Tetanus Vaccination: Yes





Physical Exam





- Vital signs


Vitals: 





                                        











Temp Pulse Resp BP Pulse Ox


 


 97.9 F   132 H  20   122/77   96 


 


 20 23:20 23:20 23:20 23:20 23:09














- General


Notes: 





Tenderness, erythema to the left groin area, patient with purulent drainage 

noted to left groin area





- Cardiovascular


Rhythm: Tachycardia


Heart sounds: S1 appreciated, S2 appreciated





Course





- Vital Signs


Vital signs: 





                                        











Temp Pulse Resp BP Pulse Ox


 


 97.9 F   132 H  20   122/77   96 


 


 20 23:20 23:20 23:20 23:20 23:09

## 2020-03-09 ENCOUNTER — HOSPITAL ENCOUNTER (EMERGENCY)
Dept: HOSPITAL 62 - ER | Age: 40
Discharge: HOME | End: 2020-03-09
Payer: SELF-PAY

## 2020-03-09 VITALS — DIASTOLIC BLOOD PRESSURE: 82 MMHG | SYSTOLIC BLOOD PRESSURE: 138 MMHG

## 2020-03-09 DIAGNOSIS — E66.9: ICD-10-CM

## 2020-03-09 DIAGNOSIS — Z79.84: ICD-10-CM

## 2020-03-09 DIAGNOSIS — Z98.51: ICD-10-CM

## 2020-03-09 DIAGNOSIS — B37.2: Primary | ICD-10-CM

## 2020-03-09 PROCEDURE — 99283 EMERGENCY DEPT VISIT LOW MDM: CPT

## 2020-03-09 PROCEDURE — 82962 GLUCOSE BLOOD TEST: CPT

## 2020-03-09 NOTE — ER DOCUMENT REPORT
ED Medical Screen (RME)





- General


Chief Complaint: Skin Problem


Stated Complaint: SKIN INFECTION


Time Seen by Provider: 20 20:37


Mode of Arrival: Ambulatory


Information source: Patient


Notes: 





Patient is an otherwise healthy 40-year-old female presenting to the emergency 

department with chief complaint of possible rash/infection to her groin and 

buttocks.  Patient reports she has had this for several years and has been on 

multiple medications.  Patient reports she was seen by a provider here in this 

emergency department approximately 1 month ago, she states that he put her on 

all the right medicines which actually started helping her symptoms.  She states

that after the medications were done her symptoms have now returned.





Unable to fully visualize patient's rash areas in the triage area.  Patient will

be seen in the back.





I have greeted and performed a rapid initial assessment of this patient.  A 

comprehensive ED assessment and evaluation of the patient, analysis of test 

results and completion of the medical decision making process will be conducted 

by additional ED providers.  I have specifically instructed the patient or 

family members with the patient to immediately return to any nursing staff 

should anything change in the patient's condition or with their chief complaint.





TRAVEL OUTSIDE OF THE [place] IN LAST 30 DAYS: No





- Related Data


Allergies/Adverse Reactions: 


                                        





naproxen [From Aleve] Allergy (Verified 20 20:40)


   











Past Medical History


Pulmonary Medical History: Reports: Hx Bronchitis, Hx Pneumonia


Endocrine Medical History: Reports: Hx Hypothyroidism


Renal/ Medical History: Denies: Hx Peritoneal Dialysis


Past Surgical History: Reports: Hx Breast Surgery - augmentation, Hx  

Section - 3, Hx Nose Surgery - Deviated septum, Hx Tonsillectomy, Other - Bilat

eral tubal ligation





- Immunizations


Immunizations up to date: Yes


Hx Diphtheria, Pertussis, Tetanus Vaccination: Yes





Physical Exam





- Vital signs


Vitals: 





                                        











Temp Pulse Resp BP Pulse Ox


 


 99.0 F   105 H  16   147/97 H  97 


 


 20 20:11  20 20:11  20 20:11  20 20:11  20 20:11














Course





- Vital Signs


Vital signs: 





                                        











Temp Pulse Resp BP Pulse Ox


 


 99.0 F   105 H  16   147/97 H  97 


 


 20 20:11  20 20:11  20 20:11  20 20:11  20 20:11

## 2020-03-10 NOTE — ER DOCUMENT REPORT
Entered by SUSAN HOLLIDAY SCRIBE  20 





Acting as scribe for:BO CHAPMAN DO





ED Skin Rash/Insect Bite/Abscs





- General


Chief Complaint: Skin Problem


Stated Complaint: SKIN INFECTION


Time Seen by Provider: 20 20:37


Primary Care Provider: 


JAYASHREE LOPEZ FNP-C [Primary Care Provider] - Follow up as needed


Mode of Arrival: Ambulatory


Information source: Patient


Notes: 





This 40 year old female patient presents to the ED today with complaints of the 

return of a rash to her bilateral groin and gluteal crease. Patient states that 

she has had the rash for x2 years and that when she was seen here approximately 

x1 month ago for it, she was prescribed a week supply of Doxy. Patient reports 

that the medications started helping her symptoms, but as soon as she finished 

it, the rash returned. Patient states that she tries to keep the area dry by 

using a fan and that she doesn't wear underwear so as to not irritate the rash. 

Patient reports that her last menstrual period was in 2017. Patient 

denies fever.


TRAVEL OUTSIDE OF THE [place] IN LAST 30 DAYS: No





- Related Data


Allergies/Adverse Reactions: 


                                        





naproxen [From AleFireScope] Allergy (Verified 20 20:40)


   








Home Medications: synthroid, metformin





Past Medical History





- General


Information source: Patient





- Social History


Smoking Status: Never Smoker


Cigarette use (# per day): No


Chew tobacco use (# tins/day): No


Smoking Education Provided: No


Frequency of alcohol use: Rare


Drug Abuse: None


Family History: Reviewed & Not Pertinent, Malignancy, Thyroid Disfunction


Patient has suicidal ideation: No


Patient has homicidal ideation: No


Pulmonary Medical History: Reports: Hx Bronchitis, Hx Pneumonia


Endocrine Medical History: Reports: Hx Diabetes Mellitus Type 2, Hx 

Hypothyroidism


Past Surgical History: Reports: Hx Breast Surgery - augmentation, Hx  

Section - 3, Hx Nose Surgery - Deviated septum, Hx Tonsillectomy, Hx Tubal 

Ligation





- Immunizations


Immunizations up to date: Yes


Hx Diphtheria, Pertussis, Tetanus Vaccination: Yes





Review of Systems





- Review of Systems


Constitutional: See HPI.  denies: Fever


EENT: No symptoms reported


Cardiovascular: No symptoms reported


Respiratory: No symptoms reported


Gastrointestinal: No symptoms reported


Genitourinary: No symptoms reported


Female Genitourinary: No symptoms reported


Musculoskeletal: No symptoms reported


Skin: See HPI, Rash


Hematologic/Lymphatic: No symptoms reported


Neurological/Psychological: No symptoms reported


-: Yes All other systems reviewed and negative





Physical Exam





- Vital signs


Vitals: 


                                        











Temp Pulse Resp BP Pulse Ox


 


 99.0 F   105 H  16   147/97 H  97 


 


 20 20:11  20 20:11  20 20:11  20 20:11  20 20:11











Interpretation: Normal





- General


General appearance: Alert





- HEENT


Head: Normocephalic, Atraumatic


Eyes: Normal


Pupils: PERRL





- Respiratory


Respiratory status: No respiratory distress


Chest status: Nontender


Breath sounds: Normal


Chest palpation: Normal





- Cardiovascular


Rhythm: Regular


Heart sounds: Normal auscultation


Murmur: No





- Abdominal


Inspection: Obese


Distension: No distension


Bowel sounds: Normal


Tenderness: Nontender - Abdomen soft


Organomegaly: No organomegaly





- Back


Back: Normal, Nontender





- Extremities


General upper extremity: Normal inspection


General lower extremity: Normal inspection





- Neurological


Neuro grossly intact: Yes





- Psychological


Associated symptoms: Normal affect, Normal mood





- Skin


Skin Temperature: Warm


Skin Moisture: Dry


Skin Color: Normal


Skin irregularity: Rash - Patient has a weeping, moist, wet, erythematous rash 

in her intertriginous areas bilaterally. No perineal involvement. Slight amount 

of discharge noted. No streaking noted.





Course





- Re-evaluation


Re-evalutation: 





20 22:36


MDM  40 year old male arrives with weeping and redness in intriginous region.  

Mild secondary bact infection to chronic yeast infection pt with borderline dm 

reportedly.  She does not have any streaking so no concern for cellulitis.  

Additionally no perianal involvement.  Discussed keeping this area as dry as 

possible and she expressed understanding.  Will have her follow up - she sees 

Zach locally.  She knows to return here for fever or worsening.  FSBS here is 

nl.  





- Vital Signs


Vital signs: 


                                        











Temp Pulse Resp BP Pulse Ox


 


 99.2 F   98   20   138/82 H  97 


 


 20 22:45  20 22:45  20 22:45  20 22:45  20 22:45














- Laboratory


Laboratory results interpreted by me: 


                                        











  20





  21:24


 


POC Glucose  117 H














Discharge





- Discharge


Clinical Impression: 


 Candida infection





Condition: Good


Disposition: HOME, SELF-CARE


Instructions:  Ringworm (Tinea Corporis) ([facility]), Vaginal Yeast Infection ([facility])


Additional Instructions: 


Take the medicine as directed.  Take tylenol or ibuprofen for pain.  See your 

doctor in follow up.  Rest.  Please return here for any problems or any 

concerns. 


Prescriptions: 


Ibuprofen [Motrin 600 mg Tablet] 600 mg PO Q8HP PRN #30 tablet


 PRN Reason: 


Fluconazole [Diflucan] 100 mg PO Q3D #5 tablet


Doxycycline Hyclate [Vibramycin 100 mg Tablet] 100 mg PO DAILY #30 tablet


Referrals: 


JAYASHREE LOPEZ, FNP-C [Primary Care Provider] - Follow up as needed





I personally performed the services described in the documentation, reviewed and

edited the documentation which was dictated to the scribe in my presence, and it

accurately records my words and actions.

## 2020-06-18 ENCOUNTER — HOSPITAL ENCOUNTER (EMERGENCY)
Dept: HOSPITAL 62 - ER | Age: 40
Discharge: LEFT BEFORE DISCHARGE | End: 2020-06-18
Payer: SELF-PAY

## 2020-06-18 VITALS — DIASTOLIC BLOOD PRESSURE: 97 MMHG | SYSTOLIC BLOOD PRESSURE: 148 MMHG

## 2020-06-18 DIAGNOSIS — E23.2: ICD-10-CM

## 2020-06-18 DIAGNOSIS — Z88.8: ICD-10-CM

## 2020-06-18 DIAGNOSIS — R41.82: Primary | ICD-10-CM

## 2020-06-18 DIAGNOSIS — Z53.29: ICD-10-CM

## 2020-06-18 DIAGNOSIS — C96.6: ICD-10-CM

## 2020-06-18 LAB
APPEARANCE UR: CLEAR
APTT PPP: COLORLESS S
BILIRUB UR QL STRIP: NEGATIVE
GLUCOSE UR STRIP-MCNC: NEGATIVE MG/DL
KETONES UR STRIP-MCNC: NEGATIVE MG/DL
NITRITE UR QL STRIP: NEGATIVE
PH UR STRIP: 7 [PH] (ref 5–9)
PROT UR STRIP-MCNC: NEGATIVE MG/DL
SP GR UR STRIP: 1
UROBILINOGEN UR-MCNC: NEGATIVE MG/DL (ref ?–2)

## 2020-06-18 PROCEDURE — 99281 EMR DPT VST MAYX REQ PHY/QHP: CPT

## 2020-06-18 PROCEDURE — 81001 URINALYSIS AUTO W/SCOPE: CPT

## 2020-06-18 NOTE — ER DOCUMENT REPORT
ED Medical Screen (RME)





- General


Chief Complaint: Seizure


Stated Complaint: POSSIBLE SEIZURE


Time Seen by Provider: 20 13:03


Primary Care Provider: 


JAYASHREE LOPEZ FNP-C [Primary Care Provider] - Follow up as needed


Notes: 





Patient is a 40-year-old female who presents to the emergency department with a 

chief complaint of a possible seizure.  Patient was lying in bed and heard a 

loud noise.  Patient states that loud noise woke her up and she could not move. 

Patient states that she ended up "blacking out" and when she can consciousness, 

she did not know what happened.  Patient was recently diagnosed with Langerhans 

cell histiocytosis.  She is being seen by [facility] for this.  She 

had an MRI done yesterday.  Patient has not received results of her MRI.





Exam: Alert and oriented.





I have greeted and performed a rapid initial assessment of this patient.  A 

comprehensive ED assessment and evaluation of the patient, analysis of test 

results and completion of medical decision making process will be conducted by 

an additional ED providers.


TRAVEL OUTSIDE OF THE [place] IN LAST 30 DAYS: No





- Related Data


Allergies/Adverse Reactions: 


                                        





naproxen [From Aleve] Allergy (Verified 20 20:40)


   











Past Medical History


Pulmonary Medical History: Reports: Hx Bronchitis, Hx Pneumonia


Endocrine Medical History: Reports: Hx Diabetes Mellitus Type 2, Hx 

Hypothyroidism


Renal/ Medical History: Denies: Hx Peritoneal Dialysis


Past Surgical History: Reports: Hx Breast Surgery - augmentation, Hx  

Section - 3, Hx Nose Surgery - Deviated septum, Hx Tonsillectomy, Hx Tubal 

Ligation, Other - Bilateral tubal ligation





- Immunizations


Immunizations up to date: Yes


Hx Diphtheria, Pertussis, Tetanus Vaccination: Yes





Physical Exam





- Vital signs


Vitals: 





                                        











Temp Pulse Resp BP Pulse Ox


 


 97.7 F   114 H  16   148/97 H  97 


 


 20 12:20 12:20 12:20 12:20 12:26














Course





- Vital Signs


Vital signs: 





                                        











Temp Pulse Resp BP Pulse Ox


 


 97.7 F   114 H  16   148/97 H  97 


 


 20 12:20 12:20 12:20 12:20 12:26














Doctor's Discharge





- Discharge


Referrals: 


JAYASHREE LOPEZ, FNP-C [Primary Care Provider] - Follow up as needed

## 2020-06-19 ENCOUNTER — HOSPITAL ENCOUNTER (OUTPATIENT)
Dept: HOSPITAL 62 - OD | Age: 40
End: 2020-06-19
Payer: SELF-PAY

## 2020-06-19 DIAGNOSIS — C96.6: Primary | ICD-10-CM

## 2020-06-19 DIAGNOSIS — R35.1: ICD-10-CM

## 2020-06-19 DIAGNOSIS — R35.8: ICD-10-CM

## 2020-06-19 PROCEDURE — 83930 ASSAY OF BLOOD OSMOLALITY: CPT

## 2020-06-19 PROCEDURE — 84295 ASSAY OF SERUM SODIUM: CPT

## 2020-06-19 PROCEDURE — 84305 ASSAY OF SOMATOMEDIN: CPT

## 2020-06-19 PROCEDURE — 83935 ASSAY OF URINE OSMOLALITY: CPT

## 2020-06-19 PROCEDURE — 82533 TOTAL CORTISOL: CPT
